# Patient Record
Sex: MALE | Race: WHITE | Employment: FULL TIME | URBAN - METROPOLITAN AREA
[De-identification: names, ages, dates, MRNs, and addresses within clinical notes are randomized per-mention and may not be internally consistent; named-entity substitution may affect disease eponyms.]

---

## 2017-02-01 ENCOUNTER — OFFICE VISIT (OUTPATIENT)
Dept: FAMILY MEDICINE CLINIC | Age: 57
End: 2017-02-01

## 2017-02-01 VITALS
SYSTOLIC BLOOD PRESSURE: 120 MMHG | HEART RATE: 76 BPM | DIASTOLIC BLOOD PRESSURE: 80 MMHG | OXYGEN SATURATION: 99 % | TEMPERATURE: 97.8 F | WEIGHT: 183.8 LBS | BODY MASS INDEX: 24.89 KG/M2 | HEIGHT: 72 IN

## 2017-02-01 DIAGNOSIS — B02.9 HERPES ZOSTER WITHOUT COMPLICATION: Primary | ICD-10-CM

## 2017-02-01 PROCEDURE — 99213 OFFICE O/P EST LOW 20 MIN: CPT | Performed by: NURSE PRACTITIONER

## 2017-02-01 ASSESSMENT — ENCOUNTER SYMPTOMS
RESPIRATORY NEGATIVE: 1
EYES NEGATIVE: 1
ALLERGIC/IMMUNOLOGIC NEGATIVE: 1
GASTROINTESTINAL NEGATIVE: 1

## 2017-04-27 ENCOUNTER — OFFICE VISIT (OUTPATIENT)
Dept: FAMILY MEDICINE CLINIC | Age: 57
End: 2017-04-27

## 2017-04-27 VITALS
BODY MASS INDEX: 24.24 KG/M2 | HEIGHT: 72 IN | OXYGEN SATURATION: 98 % | TEMPERATURE: 97.6 F | WEIGHT: 179 LBS | DIASTOLIC BLOOD PRESSURE: 76 MMHG | SYSTOLIC BLOOD PRESSURE: 132 MMHG | HEART RATE: 75 BPM

## 2017-04-27 DIAGNOSIS — E55.9 VITAMIN D DEFICIENCY: ICD-10-CM

## 2017-04-27 DIAGNOSIS — Z00.00 ROUTINE GENERAL MEDICAL EXAMINATION AT A HEALTH CARE FACILITY: ICD-10-CM

## 2017-04-27 DIAGNOSIS — M16.11 PRIMARY OSTEOARTHRITIS OF RIGHT HIP: ICD-10-CM

## 2017-04-27 DIAGNOSIS — F41.9 ANXIETY: ICD-10-CM

## 2017-04-27 DIAGNOSIS — M15.9 PRIMARY OSTEOARTHRITIS INVOLVING MULTIPLE JOINTS: ICD-10-CM

## 2017-04-27 DIAGNOSIS — G25.81 RESTLESS LEG SYNDROME: ICD-10-CM

## 2017-04-27 DIAGNOSIS — Z00.00 ROUTINE GENERAL MEDICAL EXAMINATION AT A HEALTH CARE FACILITY: Primary | ICD-10-CM

## 2017-04-27 DIAGNOSIS — J30.1 SEASONAL ALLERGIC RHINITIS DUE TO POLLEN: ICD-10-CM

## 2017-04-27 DIAGNOSIS — Z11.4 SCREENING FOR HIV (HUMAN IMMUNODEFICIENCY VIRUS): ICD-10-CM

## 2017-04-27 DIAGNOSIS — K64.4 EXTERNAL HEMORRHOID: ICD-10-CM

## 2017-04-27 DIAGNOSIS — Z12.5 SCREENING PSA (PROSTATE SPECIFIC ANTIGEN): ICD-10-CM

## 2017-04-27 DIAGNOSIS — Z11.59 NEED FOR HEPATITIS C SCREENING TEST: ICD-10-CM

## 2017-04-27 DIAGNOSIS — M47.816 OSTEOARTHRITIS OF LUMBAR SPINE, UNSPECIFIED SPINAL OSTEOARTHRITIS COMPLICATION STATUS: ICD-10-CM

## 2017-04-27 DIAGNOSIS — F32.0 MILD SINGLE CURRENT EPISODE OF MAJOR DEPRESSIVE DISORDER (HCC): ICD-10-CM

## 2017-04-27 LAB
A/G RATIO: 1.8 (ref 1.1–2.2)
ALBUMIN SERPL-MCNC: 4.4 G/DL (ref 3.4–5)
ALP BLD-CCNC: 75 U/L (ref 40–129)
ALT SERPL-CCNC: 13 U/L (ref 10–40)
ANION GAP SERPL CALCULATED.3IONS-SCNC: 10 MMOL/L (ref 3–16)
AST SERPL-CCNC: 17 U/L (ref 15–37)
BILIRUB SERPL-MCNC: 0.6 MG/DL (ref 0–1)
BILIRUBIN, POC: NORMAL
BLOOD URINE, POC: NORMAL
BUN BLDV-MCNC: 20 MG/DL (ref 7–20)
CALCIUM SERPL-MCNC: 8.8 MG/DL (ref 8.3–10.6)
CHLORIDE BLD-SCNC: 100 MMOL/L (ref 99–110)
CHOLESTEROL, TOTAL: 168 MG/DL (ref 0–199)
CLARITY, POC: CLEAR
CO2: 29 MMOL/L (ref 21–32)
COLOR, POC: YELLOW
CREAT SERPL-MCNC: 0.9 MG/DL (ref 0.9–1.3)
GFR AFRICAN AMERICAN: >60
GFR NON-AFRICAN AMERICAN: >60
GLOBULIN: 2.5 G/DL
GLUCOSE BLD-MCNC: 91 MG/DL (ref 70–99)
GLUCOSE URINE, POC: NORMAL
HDLC SERPL-MCNC: 82 MG/DL (ref 40–60)
HEPATITIS C ANTIBODY INTERPRETATION: NORMAL
KETONES, POC: NORMAL
LDL CHOLESTEROL CALCULATED: 77 MG/DL
LEUKOCYTE EST, POC: NORMAL
NITRITE, POC: NORMAL
PH, POC: 6.5
POTASSIUM SERPL-SCNC: 4.4 MMOL/L (ref 3.5–5.1)
PROTEIN, POC: NORMAL
SODIUM BLD-SCNC: 139 MMOL/L (ref 136–145)
SPECIFIC GRAVITY, POC: 1.01
TOTAL PROTEIN: 6.9 G/DL (ref 6.4–8.2)
TRIGL SERPL-MCNC: 45 MG/DL (ref 0–150)
TSH SERPL DL<=0.05 MIU/L-ACNC: 1.19 UIU/ML (ref 0.27–4.2)
UROBILINOGEN, POC: 0.2
VITAMIN D 25-HYDROXY: 44.4 NG/ML
VLDLC SERPL CALC-MCNC: 9 MG/DL

## 2017-04-27 PROCEDURE — 81002 URINALYSIS NONAUTO W/O SCOPE: CPT | Performed by: FAMILY MEDICINE

## 2017-04-27 PROCEDURE — 99396 PREV VISIT EST AGE 40-64: CPT | Performed by: FAMILY MEDICINE

## 2017-04-29 LAB — HIV-1 AND HIV-2 ANTIBODIES: NEGATIVE

## 2017-04-30 ASSESSMENT — ENCOUNTER SYMPTOMS
ALLERGIC/IMMUNOLOGIC NEGATIVE: 1
GASTROINTESTINAL NEGATIVE: 1
RESPIRATORY NEGATIVE: 1
EYES NEGATIVE: 1

## 2017-05-07 DIAGNOSIS — M16.11 PRIMARY OSTEOARTHRITIS OF RIGHT HIP: Primary | ICD-10-CM

## 2017-05-08 RX ORDER — MELOXICAM 15 MG/1
15 TABLET ORAL DAILY PRN
Qty: 90 TABLET | Refills: 1 | Status: SHIPPED | OUTPATIENT
Start: 2017-05-08 | End: 2017-12-04 | Stop reason: SDUPTHER

## 2017-05-18 ENCOUNTER — OFFICE VISIT (OUTPATIENT)
Dept: DERMATOLOGY | Age: 57
End: 2017-05-18

## 2017-05-18 DIAGNOSIS — L57.0 AK (ACTINIC KERATOSIS): ICD-10-CM

## 2017-05-18 DIAGNOSIS — L30.9 ECZEMA OF BOTH HANDS: Primary | ICD-10-CM

## 2017-05-18 PROCEDURE — 99202 OFFICE O/P NEW SF 15 MIN: CPT | Performed by: DERMATOLOGY

## 2017-05-18 PROCEDURE — 17003 DESTRUCT PREMALG LES 2-14: CPT | Performed by: DERMATOLOGY

## 2017-05-18 PROCEDURE — 17000 DESTRUCT PREMALG LESION: CPT | Performed by: DERMATOLOGY

## 2017-06-21 DIAGNOSIS — F32.0 MILD SINGLE CURRENT EPISODE OF MAJOR DEPRESSIVE DISORDER (HCC): ICD-10-CM

## 2017-06-22 RX ORDER — SERTRALINE HYDROCHLORIDE 25 MG/1
25 TABLET, FILM COATED ORAL DAILY
Qty: 90 TABLET | Refills: 1 | Status: SHIPPED | OUTPATIENT
Start: 2017-06-22 | End: 2017-12-26 | Stop reason: SDUPTHER

## 2017-08-27 PROBLEM — E87.20 LACTIC ACIDOSIS: Status: ACTIVE | Noted: 2017-08-27

## 2017-08-27 PROBLEM — R11.2 NAUSEA AND VOMITING: Status: ACTIVE | Noted: 2017-08-27

## 2017-08-27 PROBLEM — R10.9 ABDOMINAL PAIN: Status: ACTIVE | Noted: 2017-08-27

## 2017-08-27 PROBLEM — K52.9 ENTERITIS: Status: ACTIVE | Noted: 2017-08-27

## 2017-08-30 ENCOUNTER — TELEPHONE (OUTPATIENT)
Dept: FAMILY MEDICINE CLINIC | Age: 57
End: 2017-08-30

## 2017-09-05 ENCOUNTER — OFFICE VISIT (OUTPATIENT)
Dept: FAMILY MEDICINE CLINIC | Age: 57
End: 2017-09-05

## 2017-09-05 VITALS
WEIGHT: 176.8 LBS | DIASTOLIC BLOOD PRESSURE: 76 MMHG | SYSTOLIC BLOOD PRESSURE: 130 MMHG | BODY MASS INDEX: 23.95 KG/M2 | HEART RATE: 78 BPM | TEMPERATURE: 97.9 F | HEIGHT: 72 IN | OXYGEN SATURATION: 100 %

## 2017-09-05 DIAGNOSIS — R74.8 ABNORMAL LIVER ENZYMES: ICD-10-CM

## 2017-09-05 DIAGNOSIS — R74.8 ABNORMAL LIVER ENZYMES: Primary | ICD-10-CM

## 2017-09-05 LAB
ALBUMIN SERPL-MCNC: 4.2 G/DL (ref 3.4–5)
ALP BLD-CCNC: 73 U/L (ref 40–129)
ALT SERPL-CCNC: 19 U/L (ref 10–40)
AST SERPL-CCNC: 17 U/L (ref 15–37)
BILIRUB SERPL-MCNC: 0.4 MG/DL (ref 0–1)
BILIRUBIN DIRECT: <0.2 MG/DL (ref 0–0.3)
BILIRUBIN, INDIRECT: NORMAL MG/DL (ref 0–1)
TOTAL PROTEIN: 7 G/DL (ref 6.4–8.2)

## 2017-09-05 PROCEDURE — 99213 OFFICE O/P EST LOW 20 MIN: CPT | Performed by: FAMILY MEDICINE

## 2017-09-05 ASSESSMENT — ENCOUNTER SYMPTOMS
GASTROINTESTINAL NEGATIVE: 1
EYES NEGATIVE: 1
RESPIRATORY NEGATIVE: 1

## 2017-11-10 ENCOUNTER — TELEPHONE (OUTPATIENT)
Dept: OTHER | Facility: CLINIC | Age: 57
End: 2017-11-10

## 2017-11-10 NOTE — TELEPHONE ENCOUNTER
Left message for pt to return call regarding colonoscopy report. Need to get copy of report and attach it to health maintenance.

## 2017-11-13 ENCOUNTER — OFFICE VISIT (OUTPATIENT)
Dept: SURGERY | Age: 57
End: 2017-11-13

## 2017-11-13 VITALS
DIASTOLIC BLOOD PRESSURE: 79 MMHG | SYSTOLIC BLOOD PRESSURE: 132 MMHG | BODY MASS INDEX: 24.11 KG/M2 | HEIGHT: 72 IN | WEIGHT: 178 LBS | TEMPERATURE: 98 F

## 2017-11-13 DIAGNOSIS — K60.2 ANAL FISSURE: Primary | ICD-10-CM

## 2017-11-13 PROCEDURE — 99242 OFF/OP CONSLTJ NEW/EST SF 20: CPT | Performed by: SURGERY

## 2017-12-04 DIAGNOSIS — M16.11 PRIMARY OSTEOARTHRITIS OF RIGHT HIP: ICD-10-CM

## 2017-12-05 RX ORDER — MELOXICAM 15 MG/1
15 TABLET ORAL DAILY
Qty: 90 TABLET | Refills: 1 | Status: SHIPPED | OUTPATIENT
Start: 2017-12-05 | End: 2018-05-31 | Stop reason: SDUPTHER

## 2017-12-26 DIAGNOSIS — F32.0 MILD SINGLE CURRENT EPISODE OF MAJOR DEPRESSIVE DISORDER (HCC): ICD-10-CM

## 2017-12-26 RX ORDER — SERTRALINE HYDROCHLORIDE 25 MG/1
25 TABLET, FILM COATED ORAL DAILY
Qty: 90 TABLET | Refills: 0 | Status: SHIPPED | OUTPATIENT
Start: 2017-12-26 | End: 2018-06-09

## 2018-03-07 ENCOUNTER — OFFICE VISIT (OUTPATIENT)
Dept: FAMILY MEDICINE CLINIC | Age: 58
End: 2018-03-07

## 2018-03-07 VITALS
DIASTOLIC BLOOD PRESSURE: 76 MMHG | HEIGHT: 72 IN | SYSTOLIC BLOOD PRESSURE: 120 MMHG | TEMPERATURE: 97.8 F | BODY MASS INDEX: 24.87 KG/M2 | OXYGEN SATURATION: 99 % | HEART RATE: 70 BPM | WEIGHT: 183.6 LBS

## 2018-03-07 DIAGNOSIS — J30.1 CHRONIC SEASONAL ALLERGIC RHINITIS DUE TO POLLEN: ICD-10-CM

## 2018-03-07 DIAGNOSIS — K64.4 EXTERNAL HEMORRHOID: ICD-10-CM

## 2018-03-07 DIAGNOSIS — Z00.00 ROUTINE GENERAL MEDICAL EXAMINATION AT A HEALTH CARE FACILITY: ICD-10-CM

## 2018-03-07 DIAGNOSIS — M15.9 PRIMARY OSTEOARTHRITIS INVOLVING MULTIPLE JOINTS: ICD-10-CM

## 2018-03-07 DIAGNOSIS — R68.82 DECREASED LIBIDO: ICD-10-CM

## 2018-03-07 DIAGNOSIS — M19.032 PRIMARY OSTEOARTHRITIS OF LEFT WRIST: ICD-10-CM

## 2018-03-07 DIAGNOSIS — G25.81 RESTLESS LEG SYNDROME: ICD-10-CM

## 2018-03-07 DIAGNOSIS — Z00.00 ROUTINE GENERAL MEDICAL EXAMINATION AT A HEALTH CARE FACILITY: Primary | ICD-10-CM

## 2018-03-07 DIAGNOSIS — E55.9 VITAMIN D DEFICIENCY: ICD-10-CM

## 2018-03-07 DIAGNOSIS — F32.0 MILD SINGLE CURRENT EPISODE OF MAJOR DEPRESSIVE DISORDER (HCC): ICD-10-CM

## 2018-03-07 PROBLEM — E87.20 LACTIC ACIDOSIS: Status: RESOLVED | Noted: 2017-08-27 | Resolved: 2018-03-07

## 2018-03-07 PROBLEM — R11.2 NAUSEA AND VOMITING: Status: RESOLVED | Noted: 2017-08-27 | Resolved: 2018-03-07

## 2018-03-07 PROBLEM — K52.9 ENTERITIS: Status: RESOLVED | Noted: 2017-08-27 | Resolved: 2018-03-07

## 2018-03-07 PROBLEM — R10.9 INTRACTABLE ABDOMINAL PAIN: Status: RESOLVED | Noted: 2017-08-27 | Resolved: 2018-03-07

## 2018-03-07 LAB
A/G RATIO: 1.6 (ref 1.1–2.2)
ALBUMIN SERPL-MCNC: 4.4 G/DL (ref 3.4–5)
ALP BLD-CCNC: 72 U/L (ref 40–129)
ALT SERPL-CCNC: 28 U/L (ref 10–40)
ANION GAP SERPL CALCULATED.3IONS-SCNC: 11 MMOL/L (ref 3–16)
AST SERPL-CCNC: 27 U/L (ref 15–37)
BILIRUB SERPL-MCNC: 0.3 MG/DL (ref 0–1)
BILIRUBIN, POC: NORMAL
BLOOD URINE, POC: NORMAL
BUN BLDV-MCNC: 20 MG/DL (ref 7–20)
CALCIUM SERPL-MCNC: 8.7 MG/DL (ref 8.3–10.6)
CHLORIDE BLD-SCNC: 101 MMOL/L (ref 99–110)
CHOLESTEROL, TOTAL: 201 MG/DL (ref 0–199)
CLARITY, POC: CLEAR
CO2: 28 MMOL/L (ref 21–32)
COLOR, POC: YELLOW
CREAT SERPL-MCNC: 0.7 MG/DL (ref 0.9–1.3)
GFR AFRICAN AMERICAN: >60
GFR NON-AFRICAN AMERICAN: >60
GLOBULIN: 2.8 G/DL
GLUCOSE BLD-MCNC: 107 MG/DL (ref 70–99)
GLUCOSE URINE, POC: NORMAL
HDLC SERPL-MCNC: 81 MG/DL (ref 40–60)
KETONES, POC: NORMAL
LDL CHOLESTEROL CALCULATED: 109 MG/DL
LEUKOCYTE EST, POC: NORMAL
NITRITE, POC: NORMAL
PH, POC: 6
POTASSIUM SERPL-SCNC: 4.7 MMOL/L (ref 3.5–5.1)
PROTEIN, POC: NORMAL
SODIUM BLD-SCNC: 140 MMOL/L (ref 136–145)
SPECIFIC GRAVITY, POC: 1.01
TOTAL PROTEIN: 7.2 G/DL (ref 6.4–8.2)
TRIGL SERPL-MCNC: 56 MG/DL (ref 0–150)
TSH SERPL DL<=0.05 MIU/L-ACNC: 1.24 UIU/ML (ref 0.27–4.2)
UROBILINOGEN, POC: 0.2
VITAMIN D 25-HYDROXY: 53.4 NG/ML
VLDLC SERPL CALC-MCNC: 11 MG/DL

## 2018-03-07 PROCEDURE — 99396 PREV VISIT EST AGE 40-64: CPT | Performed by: FAMILY MEDICINE

## 2018-03-07 PROCEDURE — 81002 URINALYSIS NONAUTO W/O SCOPE: CPT | Performed by: FAMILY MEDICINE

## 2018-03-07 ASSESSMENT — ENCOUNTER SYMPTOMS
RESPIRATORY NEGATIVE: 1
EYES NEGATIVE: 1
GASTROINTESTINAL NEGATIVE: 1
ALLERGIC/IMMUNOLOGIC NEGATIVE: 1

## 2018-03-07 NOTE — PROGRESS NOTES
3/7/18    Arcadio Farmer  1960      Chief Complaint   Patient presents with    Annual Exam     Well Adult Physical: Patient here for a comprehensive physical exam.The patient reports no problems  Do you take any herbs or supplements that were not prescribed by a doctor? no Are you taking calcium supplements? no Are you taking aspirin daily? yes   History:        /76   Pulse 70   Temp 97.8 °F (36.6 °C) (Oral)   Ht 6' (1.829 m)   Wt 183 lb 9.6 oz (83.3 kg)   SpO2 99%   BMI 24.90 kg/m²       Immunization History   Administered Date(s) Administered    Tdap (Boostrix, Adacel) 03/24/2015       No Known Allergies  Outpatient Prescriptions Marked as Taking for the 3/7/18 encounter (Office Visit) with Yo Lino MD   Medication Sig Dispense Refill    sertraline (ZOLOFT) 25 MG tablet Take 1 tablet by mouth daily 90 tablet 0    meloxicam (MOBIC) 15 MG tablet Take 1 tablet by mouth daily 90 tablet 1    Cholecalciferol (VITAMIN D) 2000 UNITS CAPS capsule Take by mouth PATIENT ONLY TAKES DURING WINTER MONTHS         Past Medical History:   Diagnosis Date    Allergic rhinitis     Anxiety     Chicken pox     Degenerative joint disease (DJD) of lumbar spine 12/17/2014    Depression     External hemorrhoid      Past Surgical History:   Procedure Laterality Date    APPENDECTOMY      COLONOSCOPY  1.2014    HERNIA REPAIR      bialt    SHOULDER SURGERY Left     cyst and labrum tear repair    VASECTOMY       Family History   Problem Relation Age of Onset    High Blood Pressure Mother     Cancer Mother      colon    Cancer Father      lung, kidney    High Blood Pressure Sister     Heart Disease Brother     Early Death Brother     High Blood Pressure Brother     High Blood Pressure Brother      Social History     Social History    Marital status:      Spouse name: N/A    Number of children: N/A    Years of education: N/A     Occupational History    Not on file.      Social History normal range of motion, no swelling, no effusion and no deformity. No tenderness found. Left elbow: Normal. He exhibits normal range of motion, no swelling, no effusion, no deformity and no laceration. No tenderness found. Right wrist: He exhibits decreased range of motion, tenderness and bony tenderness. He exhibits no swelling, no effusion, no crepitus and no deformity. Left wrist: He exhibits decreased range of motion, tenderness and bony tenderness. He exhibits no swelling, no effusion, no crepitus and no deformity. Right hip: He exhibits decreased range of motion, decreased strength, tenderness and bony tenderness. He exhibits no swelling, no crepitus and no deformity. Left hip: He exhibits decreased range of motion and tenderness. He exhibits normal strength, no bony tenderness, no swelling, no crepitus and no deformity. Right knee: Normal. He exhibits normal range of motion, no swelling, no effusion, no ecchymosis, no deformity, normal patellar mobility and no bony tenderness. Left knee: Normal. He exhibits normal range of motion, no swelling, no effusion, no ecchymosis, no deformity, normal alignment, normal patellar mobility and no bony tenderness. No tenderness found. Right ankle: Normal. He exhibits normal range of motion, no swelling, no ecchymosis, no deformity and normal pulse. Achilles tendon normal. Achilles tendon exhibits no pain and no defect. Left ankle: Normal. He exhibits normal range of motion, no swelling, no ecchymosis, no deformity and normal pulse. No tenderness. Achilles tendon normal. Achilles tendon exhibits no pain and no defect. Cervical back: Normal. He exhibits normal range of motion, no tenderness, no bony tenderness, no pain and no spasm. Thoracic back: Normal. He exhibits normal range of motion, no tenderness, no bony tenderness, no deformity, no pain and no spasm.         Lumbar back: He exhibits single current episode of major depressive disorder ([facility] [address] Condition stable continue the medications, treatments, will check labs as appropriate        3. Vitamin D deficiency Condition stable continue the medications, treatments, will check labs as appropriate     4. Restless leg syndrome Condition stable continue the medications, treatments, will check labs as appropriate     5. Primary osteoarthritis involving multiple joints   The condition is deteriorating, will change treatment, investigate cause and make further recommendations when data back. Need todo exercises     6. External hemorrhoid Condition stable continue the medications, treatments, will check labs as appropriate     7. Decreased libido Condition stable continue the medications, treatments, will check labs as appropriate     8. Chronic seasonal allergic rhinitis due to pollen Condition stable continue the medications, treatments, will check labs as appropriate     9. Primary osteoarthritis of left wrist   The condition is deteriorating, will change treatment, investigate cause and make further recommendations when data back.  Need to do exercise wilda Thomson MD

## 2018-03-08 ENCOUNTER — PATIENT MESSAGE (OUTPATIENT)
Dept: FAMILY MEDICINE CLINIC | Age: 58
End: 2018-03-08

## 2018-03-08 DIAGNOSIS — M16.11 PRIMARY OSTEOARTHRITIS OF RIGHT HIP: Primary | ICD-10-CM

## 2018-03-09 ENCOUNTER — PATIENT MESSAGE (OUTPATIENT)
Dept: FAMILY MEDICINE CLINIC | Age: 58
End: 2018-03-09

## 2018-03-09 RX ORDER — TRAMADOL HYDROCHLORIDE 50 MG/1
50 TABLET ORAL EVERY 6 HOURS PRN
Qty: 28 TABLET | Refills: 0 | Status: SHIPPED | OUTPATIENT
Start: 2018-03-09 | End: 2018-03-16

## 2018-03-09 NOTE — TELEPHONE ENCOUNTER
From: Mary Brandon  To: Deion Little MD  Sent: 3/8/2018 10:20 PM EST  Subject: Visit Follow-Up Question    Hello again Dr. Rika Vera. Sorry to ask this, but my right leg is back to [employer] again. Tylenol and / or Advil do not make any improvement. What do you recommend I do? Maybe see a specialist or come back to see you? Any help is greatly appreciated.  Thanks, Merrill Ganser

## 2018-03-20 ENCOUNTER — OFFICE VISIT (OUTPATIENT)
Dept: ORTHOPEDIC SURGERY | Age: 58
End: 2018-03-20

## 2018-03-20 VITALS
BODY MASS INDEX: 24.38 KG/M2 | SYSTOLIC BLOOD PRESSURE: 128 MMHG | HEIGHT: 72 IN | WEIGHT: 180 LBS | DIASTOLIC BLOOD PRESSURE: 77 MMHG

## 2018-03-20 DIAGNOSIS — M51.36 DDD (DEGENERATIVE DISC DISEASE), LUMBAR: Primary | ICD-10-CM

## 2018-03-20 DIAGNOSIS — M54.16 LUMBAR RADICULITIS: ICD-10-CM

## 2018-03-20 DIAGNOSIS — M54.50 PAIN OF LUMBAR SPINE: ICD-10-CM

## 2018-03-20 PROCEDURE — 99243 OFF/OP CNSLTJ NEW/EST LOW 30: CPT | Performed by: PHYSICAL MEDICINE & REHABILITATION

## 2018-03-20 RX ORDER — METHYLPREDNISOLONE 4 MG/1
TABLET ORAL
Qty: 1 KIT | Refills: 0 | Status: SHIPPED | OUTPATIENT
Start: 2018-03-20 | End: 2018-03-26

## 2018-03-20 RX ORDER — TRAMADOL HYDROCHLORIDE 50 MG/1
50 TABLET ORAL DAILY PRN
COMMUNITY
End: 2019-01-30

## 2018-03-23 ENCOUNTER — HOSPITAL ENCOUNTER (OUTPATIENT)
Dept: PHYSICAL THERAPY | Age: 58
Discharge: HOME | End: 2018-03-31
Admitting: PHYSICAL MEDICINE & REHABILITATION

## 2018-03-30 ENCOUNTER — HOSPITAL ENCOUNTER (OUTPATIENT)
Dept: PHYSICAL THERAPY | Age: 58
Discharge: HOME | End: 2018-03-31
Admitting: PHYSICAL MEDICINE & REHABILITATION

## 2018-04-01 ENCOUNTER — HOSPITAL ENCOUNTER (OUTPATIENT)
Dept: PHYSICAL THERAPY | Age: 58
Discharge: HOME | End: 2018-04-30
Attending: PHYSICAL MEDICINE & REHABILITATION | Admitting: PHYSICAL MEDICINE & REHABILITATION

## 2018-04-06 ENCOUNTER — HOSPITAL ENCOUNTER (OUTPATIENT)
Dept: PHYSICAL THERAPY | Age: 58
Discharge: HOME | End: 2018-04-07
Admitting: PHYSICAL MEDICINE & REHABILITATION

## 2018-04-06 NOTE — FLOWSHEET NOTE
[address] [facility]  [address], [place], [address]          Phone: [phone]   Fax:     [phone]      Physical Therapy Daily Treatment Note  Date:  2018    Patient Name:  Sanju Ugalde    :  1960  MRN: [id number]  Restrictions/Precautions:    Medical/Treatment Diagnosis Information:  · Diagnosis: M51.36 DDD, lumbar; M54.16 lumbar radiculitis; M54.5 pain of lumbar spine  · Treatment Diagnosis: LBP  Insurance/Certification information:  PT Insurance Information: Abie, $50/25 copay, 25 visits, no auth req  Physician Information:  Referring Practitioner: Amrit Andrews MD  Plan of care signed (Y/N):     Date of Patient follow up with Physician:     G-Code (if applicable):      Date G-Code Applied:    PT G-Codes  Functional Assessment Tool Used: Modified Oswestry  Score: 34% disability  Functional Limitation: Changing and maintaining body position  Changing and Maintaining Body Position Current Status (): At least 20 percent but less than 40 percent impaired, limited or restricted  Changing and Maintaining Body Position Goal Status ():  At least 1 percent but less than 20 percent impaired, limited or restricted    Progress Note: []  Yes  []  No  Next due by: Visit #10      Latex Allergy:  [x]NO      []YES  Preferred Language for Healthcare:   [x]English       []other:    Visit # Insurance Allowable   3 25     Pain level:  5-7/10     SUBJECTIVE:      OBJECTIVE:   Observation:   Test measurements:      RESTRICTIONS/PRECAUTIONS: anxiety/depression, R hip OA    Exercises/Interventions:      Exercises/Interventions: Rx 18  intervention reps sets notes       self hamstring stretch tableside 2x30\" B  Manual 330   Manual piriformis stretch      Manual prone quad stretch      Manual sidelying hip flexor stretch      LAD R hip, inferior and lateral mobs, IR stretch, rollerstick over hip and ITB 15\"         Manual supine

## 2018-04-13 ENCOUNTER — HOSPITAL ENCOUNTER (OUTPATIENT)
Dept: PHYSICAL THERAPY | Age: 58
Discharge: HOME | End: 2018-04-14
Admitting: PHYSICAL MEDICINE & REHABILITATION

## 2018-04-13 NOTE — FLOWSHEET NOTE
The [address] [place] [address] and Sports Rehabilitation, [facility]  [address], [place], [address]          Phone: [phone]   Fax:     [phone]      Physical Therapy Daily Treatment Note  Date:  2018    Patient Name:  David Andres    :  1960  MRN: [id number]  Restrictions/Precautions:    Medical/Treatment Diagnosis Information:  · Diagnosis: M51.36 DDD, lumbar; M54.16 lumbar radiculitis; M54.5 pain of lumbar spine  · Treatment Diagnosis: LBP  Insurance/Certification information:  PT Insurance Information: Wood, $50/25 copay, 25 visits, no auth req  Physician Information:  Referring Practitioner: Deborah Parmar MD  Plan of care signed (Y/N):     Date of Patient follow up with Physician:     G-Code (if applicable):      Date G-Code Applied:    PT G-Codes  Functional Assessment Tool Used: Modified Oswestry  Score: 34% disability  Functional Limitation: Changing and maintaining body position  Changing and Maintaining Body Position Current Status (): At least 20 percent but less than 40 percent impaired, limited or restricted  Changing and Maintaining Body Position Goal Status (): At least 1 percent but less than 20 percent impaired, limited or restricted    Progress Note: []  Yes  []  No  Next due by: Visit #10      Latex Allergy:  [x]NO      []YES  Preferred Language for Healthcare:   [x]English       []other:    Visit # Insurance Allowable   4 25     Pain level:  3-4/10     SUBJECTIVE:  Pt reports he had a good start of the week but yesterday was painful. He reports it's too early to tell at this point in the day to tell how his low back and hip are.     OBJECTIVE:   Observation:   Test measurements:      RESTRICTIONS/PRECAUTIONS: anxiety/depression, R hip OA    Exercises/Interventions:      Exercises/Interventions: Rx 18  intervention reps sets notes       self hamstring stretch tableside 2x30\" B  Manual 3/30   Manual piriformis stretch      Manual

## 2018-04-24 ENCOUNTER — OFFICE VISIT (OUTPATIENT)
Dept: ORTHOPEDIC SURGERY | Age: 58
End: 2018-04-24

## 2018-04-24 VITALS
WEIGHT: 179.9 LBS | HEART RATE: 95 BPM | BODY MASS INDEX: 24.37 KG/M2 | HEIGHT: 72 IN | SYSTOLIC BLOOD PRESSURE: 144 MMHG | DIASTOLIC BLOOD PRESSURE: 86 MMHG

## 2018-04-24 DIAGNOSIS — M25.551 RIGHT HIP PAIN: ICD-10-CM

## 2018-04-24 DIAGNOSIS — M51.36 DDD (DEGENERATIVE DISC DISEASE), LUMBAR: Primary | ICD-10-CM

## 2018-04-24 PROCEDURE — 99213 OFFICE O/P EST LOW 20 MIN: CPT | Performed by: PHYSICAL MEDICINE & REHABILITATION

## 2018-04-30 ENCOUNTER — OFFICE VISIT (OUTPATIENT)
Dept: ORTHOPEDIC SURGERY | Age: 58
End: 2018-04-30

## 2018-04-30 VITALS
BODY MASS INDEX: 23.7 KG/M2 | SYSTOLIC BLOOD PRESSURE: 140 MMHG | HEART RATE: 62 BPM | DIASTOLIC BLOOD PRESSURE: 84 MMHG | WEIGHT: 175 LBS | HEIGHT: 72 IN

## 2018-04-30 DIAGNOSIS — M16.31 OSTEOARTHRITIS RESULTING FROM RIGHT HIP DYSPLASIA: Primary | ICD-10-CM

## 2018-04-30 PROCEDURE — 20611 DRAIN/INJ JOINT/BURSA W/US: CPT | Performed by: ORTHOPAEDIC SURGERY

## 2018-04-30 PROCEDURE — 99243 OFF/OP CNSLTJ NEW/EST LOW 30: CPT | Performed by: ORTHOPAEDIC SURGERY

## 2018-05-01 ENCOUNTER — HOSPITAL ENCOUNTER (OUTPATIENT)
Dept: PHYSICAL THERAPY | Age: 58
Discharge: HOME | End: 2018-05-31
Attending: PHYSICAL MEDICINE & REHABILITATION | Admitting: PHYSICAL MEDICINE & REHABILITATION

## 2018-05-10 ENCOUNTER — PATIENT MESSAGE (OUTPATIENT)
Dept: FAMILY MEDICINE CLINIC | Age: 58
End: 2018-05-10

## 2018-05-10 DIAGNOSIS — M15.9 PRIMARY OSTEOARTHRITIS INVOLVING MULTIPLE JOINTS: Primary | ICD-10-CM

## 2018-05-11 PROBLEM — M12.9 ARTHRITIS, MULTIPLE JOINT INVOLVEMENT: Status: RESOLVED | Noted: 2018-05-11 | Resolved: 2018-05-11

## 2018-05-11 PROBLEM — M12.9 ARTHRITIS, MULTIPLE JOINT INVOLVEMENT: Status: ACTIVE | Noted: 2018-05-11

## 2018-05-15 ENCOUNTER — OFFICE VISIT (OUTPATIENT)
Dept: DERMATOLOGY | Age: 58
End: 2018-05-15

## 2018-05-15 DIAGNOSIS — L57.0 AK (ACTINIC KERATOSIS): Primary | ICD-10-CM

## 2018-05-15 DIAGNOSIS — D18.01 CHERRY ANGIOMA: ICD-10-CM

## 2018-05-15 PROCEDURE — 99213 OFFICE O/P EST LOW 20 MIN: CPT | Performed by: DERMATOLOGY

## 2018-05-31 DIAGNOSIS — M16.11 PRIMARY OSTEOARTHRITIS OF RIGHT HIP: ICD-10-CM

## 2018-06-01 RX ORDER — MELOXICAM 15 MG/1
15 TABLET ORAL DAILY PRN
Qty: 90 TABLET | Refills: 1 | Status: SHIPPED | OUTPATIENT
Start: 2018-06-01 | End: 2019-01-22 | Stop reason: SDUPTHER

## 2018-06-09 DIAGNOSIS — F32.0 MILD SINGLE CURRENT EPISODE OF MAJOR DEPRESSIVE DISORDER (HCC): ICD-10-CM

## 2018-06-09 RX ORDER — SERTRALINE HYDROCHLORIDE 25 MG/1
25 TABLET, FILM COATED ORAL DAILY
Qty: 90 TABLET | Refills: 1 | Status: SHIPPED | OUTPATIENT
Start: 2018-06-09 | End: 2019-01-22 | Stop reason: SDUPTHER

## 2018-08-10 ENCOUNTER — OFFICE VISIT (OUTPATIENT)
Dept: ORTHOPEDIC SURGERY | Age: 58
End: 2018-08-10

## 2018-08-10 VITALS
HEART RATE: 85 BPM | HEIGHT: 72 IN | BODY MASS INDEX: 23.7 KG/M2 | WEIGHT: 175 LBS | DIASTOLIC BLOOD PRESSURE: 76 MMHG | SYSTOLIC BLOOD PRESSURE: 144 MMHG

## 2018-08-10 DIAGNOSIS — M25.552 LEFT HIP PAIN: Primary | ICD-10-CM

## 2018-08-10 PROCEDURE — 20611 DRAIN/INJ JOINT/BURSA W/US: CPT | Performed by: ORTHOPAEDIC SURGERY

## 2018-08-10 PROCEDURE — 99213 OFFICE O/P EST LOW 20 MIN: CPT | Performed by: ORTHOPAEDIC SURGERY

## 2018-08-10 NOTE — PROGRESS NOTES
Posterior Impingement    [] Shuck test for insufficient suction seal   [] Dial test for capsular insufficiency:    [] Resisted adduction for athletic pubalgia   [] Resisted curl up for athletic pubalgia     Motor Function:  [x] No gross motor weakness of hip [x] No gross motor weakness of knee  [x] No gross motor weakness of ankle    [x] No gross motor weakness of great toe      Neurologic:   [x] Sensation to light touch intact  [x] Coordination / proprioception intact  Motor function intact L2-S1    Circulation:  [x] The limb is warm and well perfused. [x] Capillary refill is intact. [] Edema:  [x] none  [] mild  [] moderate  [] severe       Data Reviewed:     XRays:  (4 views: Standing AP, 45 degree Andrew, Frog leg lateral and False Profile) of his LEFT hips and pelvis taken today 8/10/18 in the office and reviewed by me personally showed: Loss of joint space. Findings consistent with osteoarthritis include asymmetric joint space narrowing, subchondral sclerosis, osteophytes. Other Imaging:  NONE    Assessment:     Jb Stewart is a 59y.o. year old male who presents with Left and right sided primary hip arthritis. Thus far Jb Stewart has tried a intra-articular hip joint injection in the right side for hip OA and had a very extremely good response. In regards to his left hip he has not tried anything for this including injections or MRI. He has taken over-the-counter anti-inflammatory agents which have not been helpful for him. Diagnosis:    Diagnosis Orders   1. Left hip pain  XR HIP LEFT (2-3 VIEWS)    Ultrasound guided needle placement    KS ARTHROCENTESIS ASPIR&/INJ MAJOR JT/BURSA W/O US    KS METHYLPREDNISOLONE 40 MG INJ         Plan:     I discussed the diagnosis and the treatment options with Jb Stewart today as well as the nature of the condition. We are recommending that he undergo a left intra-articular hip joint injection which he was agreeable to.   He was asked to continue to use oral

## 2018-08-24 ENCOUNTER — OFFICE VISIT (OUTPATIENT)
Dept: ORTHOPEDIC SURGERY | Age: 58
End: 2018-08-24

## 2018-08-24 VITALS
HEART RATE: 95 BPM | WEIGHT: 175 LBS | HEIGHT: 72 IN | SYSTOLIC BLOOD PRESSURE: 122 MMHG | BODY MASS INDEX: 23.7 KG/M2 | DIASTOLIC BLOOD PRESSURE: 70 MMHG

## 2018-08-24 DIAGNOSIS — M16.0 BILATERAL PRIMARY OSTEOARTHRITIS OF HIP: ICD-10-CM

## 2018-08-24 DIAGNOSIS — M54.5 LOW BACK PAIN, UNSPECIFIED BACK PAIN LATERALITY, UNSPECIFIED CHRONICITY, WITH SCIATICA PRESENCE UNSPECIFIED: Primary | ICD-10-CM

## 2018-08-24 PROCEDURE — 99214 OFFICE O/P EST MOD 30 MIN: CPT | Performed by: ORTHOPAEDIC SURGERY

## 2018-08-24 NOTE — PROGRESS NOTES
rotation: 10 positive Mild discomfort  Supine External rotation: 45    Palpation:   Nontender    Provocative Tests:  [x] Negative: log roll  Positive Tests:  [] Log Roll   [] Kamini Test: ITB Tightness    [] FADIR Anterior impingement: Positive   [] Posterior Impingement    [] Shuck test for insufficient suction seal   [] Dial test for capsular insufficiency:    [] Resisted adduction for athletic pubalgia   [] Resisted curl up for athletic pubalgia     Motor Function:  [x] No gross motor weakness of hip [x] No gross motor weakness of knee  [x] No gross motor weakness of ankle    [x] No gross motor weakness of great toe    Neurologic:   [x] Sensation to light touch intact  [x] Coordination / proprioception intact  Motor function intact L2-S1    Circulation:  [x] The limb is warm and well perfused. [x] Capillary refill is intact.   [x] Edema:  [x] none  [] mild  [] moderate  [] severe     LEFT HIP ORTHOPAEDIC  EXAM:  Inspection:  [x] Skin intact without abrasion, lacerations or rashes  [x] Leg lengths equal  [x] Ecchymosis:  [x] none  [] mild  [] moderate  [] severe   [x] Atrophy:  [x] none  [] mild  [] moderate  [] severe      Range of Motion:  [x] No flexion contracture         [] Deferred: acute injury/post-surgery/pain  [] Flexion contracture     Forward flexion: 90  Supine Internal rotation: 10 mild discomfort  Supine External rotation: 45    Palpation:   Nontender    Provocative Tests:  [x] Negative: Logroll  Positive Tests:  [] Log Roll   [] Kamini Test: ITB Tightness    [] FADIR Anterior impingement: Positive   [] Posterior Impingement    [] Shuck test for insufficient suction seal   [] Dial test for capsular insufficiency:    [] Resisted adduction for athletic pubalgia   [] Resisted curl up for athletic pubalgia     Motor Function:  [x] No gross motor weakness of hip [x] No gross motor weakness of knee  [x] No gross motor weakness of ankle    [x] No gross motor weakness of great toe    Neurologic:   [x] Sensation to light touch intact  [x] Coordination / proprioception intact  Motor function intact L2-S1    Circulation:  [x] The limb is warm and well perfused. [x] Capillary refill is intact. [x] Edema:  [x] none  [] mild  [] moderate  [] severe     Data Reviewed:     XRays:  No new imaging studies were obtained today. Assessment:     Venessa Salas is a 59y.o. year old male who presents with left and right sided primary hip arthritis. Thus far the intra-articular injections have been helpful to decrease his pain. He presents today with low back pain without history of injury. Diagnosis:    Diagnosis Orders   1. Low back pain, unspecified back pain laterality, unspecified chronicity, with sciatica presence unspecified  [facility]    Amrita Muniz MD (spine)   2. Bilateral primary osteoarthritis of hip  [facility]         Plan:     I discussed the diagnosis and the treatment options with Venessa Salas today as well as the nature of the condition. As far as his hips we will continue to monitor him and may consider repeat injections in the future as needed. I will send him for physical therapy for both hips and his lower back. If he symptoms in his low back worsen or fail to improve over the next few weeks I would like him to follow up with a spine specialist. I did refer him to Dr. Argelia Simmons. Questions were entertained and answered today. He is in agreement with this plan. Return to Clinic/Follow - Up:  6-8 weeks PRN    Venessa Salas was instructed to call the office if his symptoms worsen or if new symptoms appear prior to the next scheduled visit. He is specifically instructed to contact the office between now & his scheduled appointment if he has concerns related to his condition or if he needs assistance in scheduling the above tests. He is welcome to call for an appointment sooner if he has any additional concerns or questions. Orders Placed This Encounter   Procedures    [facility]     Referral Priority:   Routine     Referral Type:   Eval and Treat     Referral Reason:   Specialty Services Required     Requested Specialty:   Physical Therapy     Number of Visits Requested:   1    Sandra Mathur MD (spine)     Referral Priority:   Routine     Referral Type:   Consult for Advice and Opinion     Referral Reason:   Specialty Services Required     Referred to Provider:   Austin Page MD     Requested Specialty:   Pain Management     Number of Visits Requested:   1       Refills/New Prescriptions:  No orders of the defined types were placed in this encounter. Today's prescription medications will be e-scribed (when appropriate) to the Patient's Preferred Pharmacy:   [facility], [address]  [address] [place] [address] Av  Phone: [phone] Fax: [phone]     9:36 AM      Madelin Dubon PA-C  Physician Assistant, Orthopedic Surgery and Sports Medicine    During this examination, Madelin Dubon PA-C, functioned as a scribe for Dr. Kristy Carvalho. This dictation was performed with a verbal recognition program (DRAGON) and it was checked for errors. It is possible that there are still dictated errors within this office note. If so, please bring any errors to my attention for an addendum. All efforts were made to ensure that this office note is accurate. Attending Attestation Note:    I, Dr. Kristy Carvalho MD, personally performed the services described in this documentation as scribed by Madelin Dubon PA-C   in my presence, and it is both accurate and complete.       Kristy Carvalho MD  Orthopaedic Surgeon, Sports Medicine  Director, Hip Arthroscopy and [address] [place] [address]

## 2018-08-28 ENCOUNTER — OFFICE VISIT (OUTPATIENT)
Dept: ORTHOPEDIC SURGERY | Age: 58
End: 2018-08-28

## 2018-08-28 ENCOUNTER — HOSPITAL ENCOUNTER (OUTPATIENT)
Dept: PHYSICAL THERAPY | Age: 58
Setting detail: THERAPIES SERIES
Discharge: HOME | End: 2018-08-28
Payer: COMMERCIAL

## 2018-08-28 VITALS — HEIGHT: 74 IN | WEIGHT: 175.04 LBS | BODY MASS INDEX: 22.46 KG/M2

## 2018-08-28 DIAGNOSIS — S39.012A STRAIN OF LUMBAR REGION, INITIAL ENCOUNTER: Primary | ICD-10-CM

## 2018-08-28 DIAGNOSIS — Z91.81 STATUS POST FALL: ICD-10-CM

## 2018-08-28 DIAGNOSIS — M47.816 SPONDYLOSIS OF LUMBAR REGION WITHOUT MYELOPATHY OR RADICULOPATHY: ICD-10-CM

## 2018-08-28 PROCEDURE — G8978 MOBILITY CURRENT STATUS: HCPCS | Performed by: PHYSICAL THERAPIST

## 2018-08-28 PROCEDURE — 99214 OFFICE O/P EST MOD 30 MIN: CPT | Performed by: PHYSICAL MEDICINE & REHABILITATION

## 2018-08-28 PROCEDURE — 97112 NEUROMUSCULAR REEDUCATION: CPT | Performed by: PHYSICAL THERAPIST

## 2018-08-28 PROCEDURE — 97161 PT EVAL LOW COMPLEX 20 MIN: CPT | Performed by: PHYSICAL THERAPIST

## 2018-08-28 PROCEDURE — 97110 THERAPEUTIC EXERCISES: CPT | Performed by: PHYSICAL THERAPIST

## 2018-08-28 PROCEDURE — 97140 MANUAL THERAPY 1/> REGIONS: CPT | Performed by: PHYSICAL THERAPIST

## 2018-08-28 PROCEDURE — G8979 MOBILITY GOAL STATUS: HCPCS | Performed by: PHYSICAL THERAPIST

## 2018-08-28 RX ORDER — CYCLOBENZAPRINE HCL 10 MG
10 TABLET ORAL NIGHTLY
Qty: 30 TABLET | Refills: 0 | Status: SHIPPED | OUTPATIENT
Start: 2018-08-28 | End: 2018-09-27

## 2018-08-28 RX ORDER — METHYLPREDNISOLONE 4 MG/1
TABLET ORAL
Qty: 1 KIT | Refills: 0 | Status: SHIPPED | OUTPATIENT
Start: 2018-08-28 | End: 2018-09-03

## 2018-08-28 NOTE — FLOWSHEET NOTE
[address] [facility]    Physical Therapy Daily Treatment Note  Date:  2018    Patient Name:  Mirtha Ramos    :  1960  MRN: [id number]  Restrictions/Precautions:    Medical/Treatment Diagnosis Information:  · Diagnosis: M54.5 (ICD-10-CM) - Low back pain, unspecified back pain laterality, unspecified chronicity, with sciatica presence unspecified; M16.0 (ICD-10-CM) - Bilateral primary osteoarthritis of hip  · Treatment Diagnosis: pelvic asymmetry, hip tightness, glute weakness, antalgic gait, impaired ADLs and IADLs  Insurance/Certification information:  PT Insurance Information: PT BENEFITS 2018 FACILITY/ BCBS/ EFFECTIVE 10-1-17/ ACTIVE/ DED 3500 MET 1655.59/ PAYS 100%/ 25 VPCY/ 0 JENNIFER/ ETHEL/ REF# [id number]/ 18 PAG  Physician Information:  Referring Practitioner: Rosana Ricardo MD  Plan of care signed (Y/N):     Date of Patient follow up with Physician: Not scheduled at this time    G-Code (if applicable):      Date G-Code Applied:  18  PT G-Codes  Functional Assessment Tool Used: MONI  Score: =46% deficit  Functional Limitation: Mobility: Walking and moving around  Mobility: Walking and Moving Around Current Status ():  At least 40 percent but less than 60 percent impaired, limited or restricted  Mobility: Walking and Moving Around Goal Status (): 0 percent impaired, limited or restricted    Progress Note: [x]  Yes  []  No  Next due by: Visit #10       Latex Allergy:  [x]NO      []YES  Preferred Language for Healthcare:   [x]English       []other:    Visit # Insurance Allowable   1 25  (4 used previously)     Pain level: 5/10     SUBJECTIVE:  See eval    OBJECTIVE:        Standing Exam Normal Abnormal N/A Comments   Toe walk   x         Heel Walk x         Side bending   x   + for tightness, more limited toward R and noted discomfort at L PSIS   Pelvic Height x         Fwd Bend- (aberrant juttering or innominate mvmt) x         Extension   x   + for tightness  Discomfort at L PSIS   Trendelenburg   x       Kemps/Quadrant x         Stork   x   hypomobile   SLS/SLS w rotation   x   pain   Rosie sign   x                   Seated Exam Normal Abnormal N/A Comments   Pelvic Height   x   R IC elevated 1 inch  L PSIS elevated superior and posterior   Seated Rotation   x   Pain on L with L rotation   Seated flexion x         B hip IR   X                               Supine Exam Normal Abnormal N/A Comments   Hip flexion   x       Abduction x         ER x         IR   x   + tightness and pain B   RAYMUNDO/Yaakov   x   + for tightness B   Hip scour x L x R       SLR x         Crossed SLR x         Supine to sit x         SI distraction/compression x         Hip thrust x L x R                               Prone Exam Normal Abnormal N/A Comments   Prone knee bend x         Prone hip IR           B Achilles reflex/Pheasant           PA/Spring x         Prone Instability test x         Sacral Spring/thrust x                                    ROM LEFT RIGHT Comments   Lumbar Flex         Lumbar Ext         Side Bend         Rotation                                ROM LEFT RIGHT Comments   Hip Flexion 90 AROM  95 PROM 90 AROM  95 PROM Limited by pain   Hip Abd 30 30     Hip ER 40 30     Hip IR 10 0     Hip Extension 5 10     Knee Ext 0 0     Knee Flex 130 130        Strength LEFT RIGHT Comments   Ilipsoas 3+/5 4-/5     Glute max 4-/5 4-/5     Glute med 3+/5 4-/5     Glute min 3+/5 4-/5     Quad 4-/5 4/5     Hamstring 4/5 4/5     Ankle DF 5/5 5/5                                      Myotomes Normal Abnormal Comments   Hip flexion (L1-L2)   x weakness   Knee extension (L2-L4)   x weakness   Dorsiflexion (L4-L5) x       Great Toe Ext (L5) x       Ankle Eversion (S1-S2) x       Ankle PF(S1-S2) x             Dermatomes Normal Abnormal Comments   inguinal area (L1)  x       anterior mid-thigh (L2) x       distal ant thigh/med knee (L3) x

## 2018-08-28 NOTE — PLAN OF CARE
that with his line of work he expected aches/pains and so would often work through them without thinking much of it. Pt gets pain through the groin that has been much better since cortisone injections, more improvement on R than L. R injection was in April while L injection was this month. About a month ago he misjudged the edge of a pool and fell in, R leg went into pool while the L leg stayed on the ground, pt states that since the pool incident his pain and symptoms have been more intense and have been constant, but no new pains have developed. Pt states that he does get pain that radiates into the anterior thigh and shin, this is not present all the time, often becomes present as the day progresses,    -locking/catching in back and hips    Pt had a few sessions of PT for the his back earlier this year, which he feels did help a little bit but he was also having trouble with his hips at that time. Pt can not play with his grandson as much as he would like d/t pain. Continues to complete all work tasks but with pain. Goals: get relief from pain  Hobbies: playing with grandson    Relevant Medical History: see intake forms  Functional Disability Index/G-Codes:  PT G-Codes  Functional Assessment Tool Used: MONI  Score: 23/50=46% deficit  Functional Limitation: Mobility: Walking and moving around  Mobility: Walking and Moving Around Current Status ():  At least 40 percent but less than 60 percent impaired, limited or restricted  Mobility: Walking and Moving Around Goal Status (): 0 percent impaired, limited or restricted    Pain Scale: 6-7/10 on average, 9/10 at worst; soreness/achiness/stiffness  Easing factors: laying in recliner  Provocative factors: squatting, laying down at night, stairs, prolonged positioning, playing with grandson    Type: [x]Constant   []Intermittent  []Radiating []Localized []other:     Numbness/Tingling: No    Occupation/School:     Living Status/Prior Level of Function: Independent with ADLs and IADLs.     OBJECTIVE:     Standing Exam Normal Abnormal N/A Comments   Toe walk   x      Heel Walk x      Side bending  x  + for tightness, more limited toward R and noted discomfort at L PSIS   Pelvic Height x      Fwd Bend- (aberrant juttering or innominate mvmt) x      Extension  x  + for tightness  Discomfort at L PSIS   Trendelenburg  x     Kemps/Quadrant x      Stork  x  hypomobile   SLS/SLS w rotation  x  pain   Rosie sign  x            Seated Exam Normal Abnormal N/A Comments   Pelvic Height  x  R IC elevated 1 inch  L PSIS elevated superior and posterior   Seated Rotation  x  Pain on L with L rotation   Seated flexion x      B hip IR  X                   Supine Exam Normal Abnormal N/A Comments   Hip flexion  x     Abduction x      ER x      IR  x  + tightness and pain B   RAYMUNDO/Yaakov  x  + for tightness B   Hip scour x L x R     SLR x      Crossed SLR x      Supine to sit x      SI distraction/compression x      Hip thrust x L x R                   Prone Exam Normal Abnormal N/A Comments   Prone knee bend x      Prone hip IR       B Achilles reflex/Pheasant       PA/Spring x      Prone Instability test x      Sacral Spring/thrust x                      ROM LEFT RIGHT Comments   Lumbar Flex      Lumbar Ext      Side Bend      Rotation                    ROM LEFT RIGHT Comments   Hip Flexion 90 AROM  95 PROM 90 AROM  95 PROM Limited by pain   Hip Abd 30 30    Hip ER 40 30    Hip IR 10 0    Hip Extension 5 10    Knee Ext 0 0    Knee Flex 130 130      Strength LEFT RIGHT Comments   Ilipsoas 3+/5 4-/5    Glute max 4-/5 4-/5    Glute med 3+/5 4-/5    Glute min 3+/5 4-/5    Quad 4-/5 4/5    Hamstring 4/5 4/5    Ankle DF 5/5 5/5                         Myotomes Normal Abnormal Comments   Hip flexion (L1-L2)  x weakness   Knee extension (L2-L4)  x weakness   Dorsiflexion (L4-L5) x     Great Toe Ext (L5) x     Ankle Eversion (S1-S2) x     Ankle PF(S1-S2) x         Dermatomes Normal Abnormal Comments   inguinal area (L1)  x     anterior mid-thigh (L2) x     distal ant thigh/med knee (L3) x     medial lower leg and foot (L4) x     lateral lower leg and foot (L5) x     posterior calf (S1) x     medial calcaneus (S2) x         Neural dynamic tension testing Normal Abnormal Comments   Slump Test  - Degree of knee flexion:  x     SLR       0-30 x     30-70 x     Femoral nerve (L2-4) x       Reflexes Normal Abnormal Comments   S1-2 Seated achilles   Unable to elicit reflex    X3-2 Prone knee bend   Unable to elicit reflex   Y0-3 Patellar tendon   Unable to elicit reflex   D9-2 Biceps   Not assessed   C6 Brachioradialis   Not assessed   C7-8 Triceps   Not assessed   Clonus   Not assessed   Babinski   Not assessed   Garcia's   Not assessed     Joint mobility:    []Normal    [x]Hypo; B hips, lumbar spine   []Hyper    Palpation: L PSIS, B hip flexor tendon    Functional Mobility/Transfers: Independent    Posture: WNL    Gait: Increased lateral sway                       [x] Patient history, allergies, meds reviewed. Medical chart reviewed. See intake form. Review Of Systems (ROS):  [x]Performed Review of systems (Integumentary, CardioPulmonary, Neurological) by intake and observation. Intake form has been scanned into medical record. Patient has been instructed to contact their primary care physician regarding ROS issues if not already being addressed at this time.       Co-morbidities/Complexities (which will affect course of rehabilitation):   []None           Arthritic conditions   []Rheumatoid arthritis (M05.9)  [x]Osteoarthritis (M19.91)   Cardiovascular conditions   []Hypertension (I10)  []Hyperlipidemia (E78.5)  []Angina pectoris (I20)  []Atherosclerosis (I70)   Musculoskeletal conditions   []Disc pathology   []Congenital spine pathologies   []Prior surgical intervention  []Osteoporosis (M81.8)  []Osteopenia (M85.8)   Endocrine conditions   []Hypothyroid (E03.9)  []Hyperthyroid

## 2018-08-28 NOTE — PROGRESS NOTES
Follow up: [facility]  1960  [id number]      CHIEF COMPLAINT:    Chief Complaint   Patient presents with    Lower Back Pain     F/u LSP. Patient last seen April 2018. States injections from Dr. Sunny Dozier for hip pain have helped. James Larson off pool edge approximately 4 weeks ago and low back pain worsened. Notes he started PT today for both low back and hip pain. HISTORY OF PRESENT ILLNESS:  Mr. Joe Suresh is a 59 y.o. male returns for a follow up visit for multiple medical problems. His current presenting problems are   1. Strain of lumbar region, initial encounter    2. Status post fall    3. Spondylosis of lumbar region without myelopathy or radiculopathy    . As per information/history obtained from the PADT(patient assessment and documentation tool) - He complains of pain in the lower back with radiation to the hips Bilateral He rates the pain 7/10 and describes it as aching. Pain is made worse by: standing, lifting. He denies side effects from the current pain regimen. Patient reports that since the last follow up visit the physical functioning is worse, family/social relationships are unchanged, mood is unchanged and sleep patterns are worse, and that the overall functioning is worse. Patient denies neurological bowel or bladder. He presents after last being seen in April 2018. He reports that he was playing with a ball and tripped on the corner of the pool and did the splits. He denies having any trauma to the low back. Since then he has had worsening back and hip pain. He saw Dr. Sunny Dozier and was sent for physical therapy. He has also in the interim had bilateral hip injections which gave him significant relief of his pain. He has not yet started physical therapy as he states he is having difficulty sleeping.     Associated signs and symptoms:   Neurogenic bowel or bladder symptoms:  no   Perceived weakness:  no   Difficulty walking:  no              Past Medical History: Past Medical History:   Diagnosis Date    Allergic rhinitis     Anxiety     Chicken pox     Degenerative joint disease (DJD) of lumbar spine 12/17/2014    Depression     External hemorrhoid       Past Surgical History:     Past Surgical History:   Procedure Laterality Date    APPENDECTOMY      COLONOSCOPY  1.2014    HERNIA REPAIR      bialt    SHOULDER SURGERY Left     cyst and labrum tear repair    VASECTOMY       Current Medications:     Current Outpatient Prescriptions:     sertraline (ZOLOFT) 25 MG tablet, Take 1 tablet by mouth daily, Disp: 90 tablet, Rfl: 1    meloxicam (MOBIC) 15 MG tablet, Take 1 tablet by mouth daily as needed for Pain, Disp: 90 tablet, Rfl: 1    traMADol (ULTRAM) 50 MG tablet, Take 50 mg by mouth daily as needed for Pain., Disp: , Rfl:     Cholecalciferol (VITAMIN D) 2000 UNITS CAPS capsule, Take by mouth PATIENT ONLY TAKES DURING WINTER MONTHS, Disp: , Rfl:   Allergies:  Patient has no known allergies. Social History:    reports that he has never smoked. He has never used smokeless tobacco. He reports that he drinks alcohol. Family History:   Family History   Problem Relation Age of Onset    High Blood Pressure Mother    [facility] Cancer Mother         colon    Cancer Father         lung, kidney    High Blood Pressure Sister     Heart Disease Brother     Early Death Brother     High Blood Pressure Brother     High Blood Pressure Brother        REVIEW OF SYSTEMS:   CONSTITUTIONAL: Denies unexplained weight loss, fevers, chills or fatigue  NEUROLOGICAL: Denies unsteady gait or progressive weakness  MUSCULOSKELETAL: Denies joint swelling or redness  GI: Denies nausea, vomiting, diarrhea   : Denies bowel or bladder issues       PHYSICAL EXAM:    Vitals: Height 6' 2.41\" (1.89 m), weight 175 lb 0.7 oz (79.4 kg). GENERAL EXAM:  · General Apparence: Patient is adequately groomed with no evidence of malnutrition.   · Psychiatric: Orientation: The patient is oriented to time, Inspection/examination of the right lower extremity does not show any tenderness, deformity or injury. Range of motion is normal and pain-free. There is no gross instability. There are no rashes, ulcerations or lesions. Strength and tone are normal. No atrophy or abnormal movements are noted. · LEFT LOWER EXTREMITY:  Inspection/examination of the left lower extremity does not show any tenderness, deformity or injury. Range of motion is normal and pain-free. There is no gross instability. There are no rashes, ulcerations or lesions. Strength and tone are normal. No atrophy or abnormal movements are noted. Diagnostic Testing:    No new diagnostics  Results for orders placed or performed in visit on 03/07/18   Lipid Panel   Result Value Ref Range    Cholesterol, Total 201 (H) 0 - 199 mg/dL    Triglycerides 56 0 - 150 mg/dL    HDL 81 (H) 40 - 60 mg/dL    LDL Calculated 109 (H) <100 mg/dL    VLDL Cholesterol Calculated 11 Not Established mg/dL   Comprehensive Metabolic Panel   Result Value Ref Range    Sodium 140 136 - 145 mmol/L    Potassium 4.7 3.5 - 5.1 mmol/L    Chloride 101 99 - 110 mmol/L    CO2 28 21 - 32 mmol/L    Anion Gap 11 3 - 16    Glucose 107 (H) 70 - 99 mg/dL    BUN 20 7 - 20 mg/dL    CREATININE 0.7 (L) 0.9 - 1.3 mg/dL    GFR Non-African American >60 >60    GFR African American >60 >60    Calcium 8.7 8.3 - 10.6 mg/dL    Total Protein 7.2 6.4 - 8.2 g/dL    Alb 4.4 3.4 - 5.0 g/dL    Albumin/Globulin Ratio 1.6 1.1 - 2.2    Total Bilirubin 0.3 0.0 - 1.0 mg/dL    Alkaline Phosphatase 72 40 - 129 U/L    ALT 28 10 - 40 U/L    AST 27 15 - 37 U/L    Globulin 2.8 g/dL   TSH without Reflex   Result Value Ref Range    TSH 1.24 0.27 - 4.20 uIU/mL   Vitamin D 25 Hydroxy   Result Value Ref Range    Vit D, 25-Hydroxy 53.4 >=30 ng/mL     Impression:       1. Strain of lumbar region, initial encounter    2. Status post fall    3.  Spondylosis of lumbar region without myelopathy or radiculopathy        Plan:  Clinical Course:new lumbar strain    1. Medications:  I will prescribe a medrol dose pack to help with the inflammatory component of pain. Risks, benefits and alternatives were discussed. Recommended to stop any NSAIDs to reduce risk of GI bleed during the course of the dose pack. Add flexeril 10 mg po qhs #30  2. PT:  Agree with PT  3. Further studies:  MR lumbar spine if symptoms worsen  4. Interventional:  None at this time  5. Follow up:  4-6 weeks          Crow Martinez MD, MITA, [facility]  Board Certified in [address] [place] [address] Certified and Fellowship Trained in [facility] ([facility])             This dictation was performed with a verbal recognition program [facility]) and it was checked for errors. It is possible that there are still dictated errors within this office note. If so, please bring any errors to my attention for an addendum. All efforts were made to ensure that this office note is accurate.

## 2018-09-25 ENCOUNTER — OFFICE VISIT (OUTPATIENT)
Dept: ORTHOPEDIC SURGERY | Age: 58
End: 2018-09-25
Payer: COMMERCIAL

## 2018-09-25 VITALS
SYSTOLIC BLOOD PRESSURE: 128 MMHG | HEIGHT: 74 IN | DIASTOLIC BLOOD PRESSURE: 77 MMHG | WEIGHT: 175 LBS | BODY MASS INDEX: 22.46 KG/M2

## 2018-09-25 DIAGNOSIS — M51.36 DDD (DEGENERATIVE DISC DISEASE), LUMBAR: Primary | ICD-10-CM

## 2018-09-25 DIAGNOSIS — R20.2 PARESTHESIA OF BOTH LOWER EXTREMITIES: ICD-10-CM

## 2018-09-25 DIAGNOSIS — M48.061 LUMBAR FORAMINAL STENOSIS: ICD-10-CM

## 2018-09-25 PROCEDURE — 99213 OFFICE O/P EST LOW 20 MIN: CPT | Performed by: PHYSICAL MEDICINE & REHABILITATION

## 2018-09-25 NOTE — PROGRESS NOTES
56 0 - 150 mg/dL    HDL 81 (H) 40 - 60 mg/dL    LDL Calculated 109 (H) <100 mg/dL    VLDL Cholesterol Calculated 11 Not Established mg/dL   Comprehensive Metabolic Panel   Result Value Ref Range    Sodium 140 136 - 145 mmol/L    Potassium 4.7 3.5 - 5.1 mmol/L    Chloride 101 99 - 110 mmol/L    CO2 28 21 - 32 mmol/L    Anion Gap 11 3 - 16    Glucose 107 (H) 70 - 99 mg/dL    BUN 20 7 - 20 mg/dL    CREATININE 0.7 (L) 0.9 - 1.3 mg/dL    GFR Non-African American >60 >60    GFR African American >60 >60    Calcium 8.7 8.3 - 10.6 mg/dL    Total Protein 7.2 6.4 - 8.2 g/dL    Alb 4.4 3.4 - 5.0 g/dL    Albumin/Globulin Ratio 1.6 1.1 - 2.2    Total Bilirubin 0.3 0.0 - 1.0 mg/dL    Alkaline Phosphatase 72 40 - 129 U/L    ALT 28 10 - 40 U/L    AST 27 15 - 37 U/L    Globulin 2.8 g/dL   TSH without Reflex   Result Value Ref Range    TSH 1.24 0.27 - 4.20 uIU/mL   Vitamin D 25 Hydroxy   Result Value Ref Range    Vit D, 25-Hydroxy 53.4 >=30 ng/mL     Impression:       1. DDD (degenerative disc disease), lumbar    2. Lumbar foraminal stenosis    3. Paresthesia of both lower extremities        Plan:  Clinical Course: Above diagnoses are worsening    I discussed the diagnosis and the treatment options with Abhilash Guillory today. In Summary:  The various treatment options were outlined and discussed with Abhilash Guillroy including:  Conservative care options: physical therapy, ice, medications, bracing, and activity modification. The indications for therapeutic injections. The indications for additional imaging/laboratory studies. The indications for (possible future) interventions. After considering the various options discussed, Abhilash Guillory elected to pursue a course of treatment that includes the followin. Medications:  Continue anti-inflammatories with appropriate GI Precautions including to stop if develop dark tarry stools or GI upset and to take with food. He was counseled not to take Mobic and ibuprofen together.   He

## 2018-10-02 ENCOUNTER — TELEPHONE (OUTPATIENT)
Dept: ORTHOPEDIC SURGERY | Age: 58
End: 2018-10-02

## 2018-10-02 ENCOUNTER — OFFICE VISIT (OUTPATIENT)
Dept: ORTHOPEDIC SURGERY | Age: 58
End: 2018-10-02
Payer: COMMERCIAL

## 2018-10-02 VITALS — BODY MASS INDEX: 23.7 KG/M2 | HEIGHT: 72 IN | WEIGHT: 175 LBS

## 2018-10-02 DIAGNOSIS — M16.0 PRIMARY OSTEOARTHRITIS OF BOTH HIPS: Primary | ICD-10-CM

## 2018-10-02 DIAGNOSIS — M43.16 SPONDYLOLISTHESIS OF LUMBAR REGION: ICD-10-CM

## 2018-10-02 DIAGNOSIS — M48.061 SPINAL STENOSIS OF LUMBAR REGION WITHOUT NEUROGENIC CLAUDICATION: ICD-10-CM

## 2018-10-02 DIAGNOSIS — M51.26 HNP (HERNIATED NUCLEUS PULPOSUS), LUMBAR: ICD-10-CM

## 2018-10-02 PROCEDURE — 99213 OFFICE O/P EST LOW 20 MIN: CPT | Performed by: PHYSICAL MEDICINE & REHABILITATION

## 2018-10-02 NOTE — PROGRESS NOTES
1.2014    HERNIA REPAIR      bialt    SHOULDER SURGERY Left     cyst and labrum tear repair    VASECTOMY       Current Medications:     Current Outpatient Prescriptions:     sertraline (ZOLOFT) 25 MG tablet, Take 1 tablet by mouth daily, Disp: 90 tablet, Rfl: 1    meloxicam (MOBIC) 15 MG tablet, Take 1 tablet by mouth daily as needed for Pain, Disp: 90 tablet, Rfl: 1    traMADol (ULTRAM) 50 MG tablet, Take 50 mg by mouth daily as needed for Pain., Disp: , Rfl:     Cholecalciferol (VITAMIN D) 2000 UNITS CAPS capsule, Take by mouth PATIENT ONLY TAKES DURING WINTER MONTHS, Disp: , Rfl:   Allergies:  Patient has no known allergies. Social History:    reports that he has never smoked. He has never used smokeless tobacco. He reports that he drinks alcohol. Family History:   Family History   Problem Relation Age of Onset    High Blood Pressure Mother    [facility] Cancer Mother         colon    Cancer Father         lung, kidney    High Blood Pressure Sister     Heart Disease Brother     Early Death Brother     High Blood Pressure Brother     High Blood Pressure Brother        REVIEW OF SYSTEMS:   CONSTITUTIONAL: Denies unexplained weight loss, fevers, chills or fatigue  NEUROLOGICAL: Denies unsteady gait or progressive weakness  MUSCULOSKELETAL: Denies joint swelling or redness  GI: Denies nausea, vomiting, diarrhea   : Denies bowel or bladder issues       PHYSICAL EXAM:    Vitals: Height 6' (1.829 m), weight 175 lb (79.4 kg). GENERAL EXAM:  · General Apparence: Patient is adequately groomed with no evidence of malnutrition. · Psychiatric: Orientation: The patient is oriented to time, place and person. The patient's mood and affect are appropriate   · Vascular: Examination reveals no swelling and palpation reveals no tenderness in upper or lower extremities. Good capillary refill.    · The lymphatic examination of the neck, axillae and groin reveals all areas to be without enlargement or right S1 nerve    roots. 4. L5 chronic spondylolysis.  Moderate bilateral hip arthropathy, hips partially characterized;    arthropathy more prominent on right. 5. Moderate to marked right hip arthrosis; mild left hip arthrosis.  Sequela of small depressed    right femoral head cortical/subcortical fracture versus arthropathic slight flattening of    superior right femoral head       Results for orders placed or performed in visit on 03/07/18   Lipid Panel   Result Value Ref Range    Cholesterol, Total 201 (H) 0 - 199 mg/dL    Triglycerides 56 0 - 150 mg/dL    HDL 81 (H) 40 - 60 mg/dL    LDL Calculated 109 (H) <100 mg/dL    VLDL Cholesterol Calculated 11 Not Established mg/dL   Comprehensive Metabolic Panel   Result Value Ref Range    Sodium 140 136 - 145 mmol/L    Potassium 4.7 3.5 - 5.1 mmol/L    Chloride 101 99 - 110 mmol/L    CO2 28 21 - 32 mmol/L    Anion Gap 11 3 - 16    Glucose 107 (H) 70 - 99 mg/dL    BUN 20 7 - 20 mg/dL    CREATININE 0.7 (L) 0.9 - 1.3 mg/dL    GFR Non-African American >60 >60    GFR African American >60 >60    Calcium 8.7 8.3 - 10.6 mg/dL    Total Protein 7.2 6.4 - 8.2 g/dL    Alb 4.4 3.4 - 5.0 g/dL    Albumin/Globulin Ratio 1.6 1.1 - 2.2    Total Bilirubin 0.3 0.0 - 1.0 mg/dL    Alkaline Phosphatase 72 40 - 129 U/L    ALT 28 10 - 40 U/L    AST 27 15 - 37 U/L    Globulin 2.8 g/dL   TSH without Reflex   Result Value Ref Range    TSH 1.24 0.27 - 4.20 uIU/mL   Vitamin D 25 Hydroxy   Result Value Ref Range    Vit D, 25-Hydroxy 53.4 >=30 ng/mL     Impression:       1. Primary osteoarthritis of both hips    2. Spinal stenosis of lumbar region without neurogenic claudication    3. Spondylolisthesis of lumbar region    4. HNP (herniated nucleus pulposus), lumbar        Plan:  Clinical Course: Above diagnoses are worsening    I discussed the diagnosis and the treatment options with Thomas Cooper today.      In Summary:  The various treatment options were outlined and discussed with Thomas Cooper

## 2018-10-16 ENCOUNTER — TELEPHONE (OUTPATIENT)
Dept: ORTHOPEDIC SURGERY | Age: 58
End: 2018-10-16

## 2018-10-16 NOTE — TELEPHONE ENCOUNTER
DOS   10/19/2018  CPT   83846.50  83756.26   83652.26   03271   NPR  DX   M16.0  M48.061   M43.16   M51.26  OP SX AUTH  [id number]  VALID   10/16/2018 - 11/16/2018  PROCEDURE   BILATERAL HIP INJECTIONS  DR. Huitron [facility]  INSURANCE:   Woody Pulido

## 2018-10-19 ENCOUNTER — APPOINTMENT (OUTPATIENT)
Dept: GENERAL RADIOLOGY | Age: 58
End: 2018-10-19
Attending: PHYSICAL MEDICINE & REHABILITATION
Payer: COMMERCIAL

## 2018-10-19 ENCOUNTER — HOSPITAL ENCOUNTER (OUTPATIENT)
Age: 58
Setting detail: OUTPATIENT SURGERY
Discharge: HOME | End: 2018-10-19
Attending: PHYSICAL MEDICINE & REHABILITATION | Admitting: PHYSICAL MEDICINE & REHABILITATION
Payer: COMMERCIAL

## 2018-10-19 VITALS
BODY MASS INDEX: 23.7 KG/M2 | SYSTOLIC BLOOD PRESSURE: 127 MMHG | HEIGHT: 72 IN | RESPIRATION RATE: 16 BRPM | DIASTOLIC BLOOD PRESSURE: 59 MMHG | HEART RATE: 70 BPM | WEIGHT: 175 LBS | OXYGEN SATURATION: 100 % | TEMPERATURE: 98 F

## 2018-10-19 PROCEDURE — 99152 MOD SED SAME PHYS/QHP 5/>YRS: CPT | Performed by: PHYSICAL MEDICINE & REHABILITATION

## 2018-10-19 PROCEDURE — 6360000002 HC RX W HCPCS: Performed by: PHYSICAL MEDICINE & REHABILITATION

## 2018-10-19 PROCEDURE — 2709999900 HC NON-CHARGEABLE SUPPLY: Performed by: PHYSICAL MEDICINE & REHABILITATION

## 2018-10-19 PROCEDURE — 77002 NEEDLE LOCALIZATION BY XRAY: CPT

## 2018-10-19 PROCEDURE — 2500000003 HC RX 250 WO HCPCS: Performed by: PHYSICAL MEDICINE & REHABILITATION

## 2018-10-19 PROCEDURE — 3610000056 HC PAIN LEVEL 4 BASE (NON-OR): Performed by: PHYSICAL MEDICINE & REHABILITATION

## 2018-10-19 RX ORDER — SODIUM CHLORIDE 9 MG/ML
1000 INJECTION, SOLUTION INTRAVENOUS ONCE
Status: DISCONTINUED | OUTPATIENT
Start: 2018-10-19 | End: 2018-10-19 | Stop reason: HOSPADM

## 2018-10-19 RX ORDER — METHYLPREDNISOLONE ACETATE 80 MG/ML
INJECTION, SUSPENSION INTRA-ARTICULAR; INTRALESIONAL; INTRAMUSCULAR; SOFT TISSUE
Status: COMPLETED | OUTPATIENT
Start: 2018-10-19 | End: 2018-10-19

## 2018-10-19 RX ORDER — BUPIVACAINE HYDROCHLORIDE 5 MG/ML
INJECTION, SOLUTION PERINEURAL
Status: COMPLETED | OUTPATIENT
Start: 2018-10-19 | End: 2018-10-19

## 2018-10-19 RX ORDER — MIDAZOLAM HYDROCHLORIDE 1 MG/ML
INJECTION INTRAMUSCULAR; INTRAVENOUS
Status: COMPLETED | OUTPATIENT
Start: 2018-10-19 | End: 2018-10-19

## 2018-10-19 ASSESSMENT — PAIN - FUNCTIONAL ASSESSMENT: PAIN_FUNCTIONAL_ASSESSMENT: 0-10

## 2018-10-19 ASSESSMENT — PAIN DESCRIPTION - DESCRIPTORS: DESCRIPTORS: SHARP;THROBBING

## 2018-10-19 ASSESSMENT — PAIN SCALES - GENERAL
PAINLEVEL_OUTOF10: 0
PAINLEVEL_OUTOF10: 0

## 2018-11-06 ENCOUNTER — OFFICE VISIT (OUTPATIENT)
Dept: ORTHOPEDIC SURGERY | Age: 58
End: 2018-11-06
Payer: COMMERCIAL

## 2018-11-06 VITALS — BODY MASS INDEX: 23.7 KG/M2 | WEIGHT: 175 LBS | HEIGHT: 72 IN

## 2018-11-06 DIAGNOSIS — M47.816 SPONDYLOSIS OF LUMBAR REGION WITHOUT MYELOPATHY OR RADICULOPATHY: ICD-10-CM

## 2018-11-06 DIAGNOSIS — M16.0 PRIMARY OSTEOARTHRITIS OF BOTH HIPS: Primary | ICD-10-CM

## 2018-11-06 DIAGNOSIS — M25.552 PAIN OF BOTH HIP JOINTS: ICD-10-CM

## 2018-11-06 DIAGNOSIS — M25.551 PAIN OF BOTH HIP JOINTS: ICD-10-CM

## 2018-11-06 PROCEDURE — 99213 OFFICE O/P EST LOW 20 MIN: CPT | Performed by: PHYSICAL MEDICINE & REHABILITATION

## 2018-11-06 NOTE — PROGRESS NOTES
12/17/2014    Depression     External hemorrhoid       Past Surgical History:     Past Surgical History:   Procedure Laterality Date    APPENDECTOMY      COLONOSCOPY  1.2014    HERNIA REPAIR      bialt    NH ARTHROCENTESIS ASPIR&/INJ MAJOR JT/BURSA W/O US Bilateral 10/19/2018    BILATERAL HIP INJECTION WITH FLUOROSCOPY performed by Patty Sanchez MD at [address] Left     cyst and labrum tear repair    VASECTOMY       Current Medications:     Current Outpatient Prescriptions:     sertraline (ZOLOFT) 25 MG tablet, Take 1 tablet by mouth daily, Disp: 90 tablet, Rfl: 1    meloxicam (MOBIC) 15 MG tablet, Take 1 tablet by mouth daily as needed for Pain, Disp: 90 tablet, Rfl: 1    traMADol (ULTRAM) 50 MG tablet, Take 50 mg by mouth daily as needed for Pain., Disp: , Rfl:     Cholecalciferol (VITAMIN D) 2000 UNITS CAPS capsule, Take by mouth PATIENT ONLY TAKES DURING WINTER MONTHS, Disp: , Rfl:   Allergies:  Patient has no known allergies. Social History:    reports that he has never smoked. He has never used smokeless tobacco. He reports that he drinks alcohol. He reports that he does not use drugs. Family History:   Family History   Problem Relation Age of Onset    High Blood Pressure Mother    [place] Cancer Mother         colon    Cancer Father         lung, kidney    High Blood Pressure Sister     Heart Disease Brother     Early Death Brother     High Blood Pressure Brother     High Blood Pressure Brother        REVIEW OF SYSTEMS:   CONSTITUTIONAL: Denies unexplained weight loss, fevers, chills or fatigue  NEUROLOGICAL: Denies unsteady gait or progressive weakness  MUSCULOSKELETAL: Denies joint swelling or redness  GI: Denies nausea, vomiting, diarrhea   : Denies bowel or bladder issues       PHYSICAL EXAM:    Vitals: Height 6' (1.829 m), weight 175 lb (79.4 kg). GENERAL EXAM:  · General Apparence: Patient is adequately groomed with no evidence of malnutrition.   · Psychiatric:

## 2019-01-22 ENCOUNTER — TELEPHONE (OUTPATIENT)
Dept: INTERNAL MEDICINE CLINIC | Age: 59
End: 2019-01-22

## 2019-01-22 DIAGNOSIS — F32.0 MILD SINGLE CURRENT EPISODE OF MAJOR DEPRESSIVE DISORDER (HCC): ICD-10-CM

## 2019-01-22 DIAGNOSIS — M16.11 PRIMARY OSTEOARTHRITIS OF RIGHT HIP: ICD-10-CM

## 2019-01-22 RX ORDER — MELOXICAM 15 MG/1
15 TABLET ORAL DAILY PRN
Qty: 30 TABLET | Refills: 0 | Status: SHIPPED | OUTPATIENT
Start: 2019-01-22 | End: 2019-01-30 | Stop reason: SDUPTHER

## 2019-01-22 RX ORDER — SERTRALINE HYDROCHLORIDE 25 MG/1
25 TABLET, FILM COATED ORAL DAILY
Qty: 30 TABLET | Refills: 0 | Status: SHIPPED | OUTPATIENT
Start: 2019-01-22 | End: 2019-01-30 | Stop reason: SDUPTHER

## 2019-01-30 ENCOUNTER — OFFICE VISIT (OUTPATIENT)
Dept: INTERNAL MEDICINE CLINIC | Age: 59
End: 2019-01-30
Payer: COMMERCIAL

## 2019-01-30 VITALS
SYSTOLIC BLOOD PRESSURE: 136 MMHG | WEIGHT: 178 LBS | DIASTOLIC BLOOD PRESSURE: 70 MMHG | HEIGHT: 72 IN | BODY MASS INDEX: 24.11 KG/M2

## 2019-01-30 DIAGNOSIS — Z00.129 LABORATORY EXAMINATION ORDERED AS PART OF A ROUTINE GENERAL MEDICAL EXAMINATION IN PEDIATRIC PATIENT: ICD-10-CM

## 2019-01-30 DIAGNOSIS — F32.0 MILD SINGLE CURRENT EPISODE OF MAJOR DEPRESSIVE DISORDER (HCC): ICD-10-CM

## 2019-01-30 DIAGNOSIS — Z12.11 ENCOUNTER FOR SCREENING COLONOSCOPY: ICD-10-CM

## 2019-01-30 DIAGNOSIS — M16.11 PRIMARY OSTEOARTHRITIS OF RIGHT HIP: Primary | ICD-10-CM

## 2019-01-30 PROCEDURE — 99203 OFFICE O/P NEW LOW 30 MIN: CPT | Performed by: INTERNAL MEDICINE

## 2019-01-30 RX ORDER — SERTRALINE HYDROCHLORIDE 25 MG/1
25 TABLET, FILM COATED ORAL DAILY
Qty: 30 TABLET | Refills: 5 | Status: SHIPPED | OUTPATIENT
Start: 2019-01-30 | End: 2019-09-11 | Stop reason: SDUPTHER

## 2019-01-30 RX ORDER — MELOXICAM 15 MG/1
15 TABLET ORAL DAILY PRN
Qty: 30 TABLET | Refills: 5 | Status: SHIPPED | OUTPATIENT
Start: 2019-01-30 | End: 2019-10-17 | Stop reason: SDUPTHER

## 2019-01-30 RX ORDER — AMITRIPTYLINE HYDROCHLORIDE 10 MG/1
10 TABLET, FILM COATED ORAL NIGHTLY
Qty: 30 TABLET | Refills: 5 | Status: SHIPPED | OUTPATIENT
Start: 2019-01-30 | End: 2019-09-11 | Stop reason: SDUPTHER

## 2019-01-30 ASSESSMENT — PATIENT HEALTH QUESTIONNAIRE - PHQ9
SUM OF ALL RESPONSES TO PHQ9 QUESTIONS 1 & 2: 0
SUM OF ALL RESPONSES TO PHQ QUESTIONS 1-9: 0
2. FEELING DOWN, DEPRESSED OR HOPELESS: 0
1. LITTLE INTEREST OR PLEASURE IN DOING THINGS: 0
SUM OF ALL RESPONSES TO PHQ QUESTIONS 1-9: 0

## 2019-02-06 ENCOUNTER — OFFICE VISIT (OUTPATIENT)
Dept: ORTHOPEDIC SURGERY | Age: 59
End: 2019-02-06
Payer: COMMERCIAL

## 2019-02-06 VITALS
WEIGHT: 177.91 LBS | HEART RATE: 82 BPM | HEIGHT: 72 IN | DIASTOLIC BLOOD PRESSURE: 80 MMHG | BODY MASS INDEX: 24.1 KG/M2 | SYSTOLIC BLOOD PRESSURE: 130 MMHG

## 2019-02-06 DIAGNOSIS — M70.61 GREATER TROCHANTERIC BURSITIS OF RIGHT HIP: ICD-10-CM

## 2019-02-06 DIAGNOSIS — M16.11 PRIMARY OSTEOARTHRITIS OF RIGHT HIP: Primary | ICD-10-CM

## 2019-02-06 DIAGNOSIS — M51.36 DDD (DEGENERATIVE DISC DISEASE), LUMBAR: ICD-10-CM

## 2019-02-06 PROCEDURE — 99214 OFFICE O/P EST MOD 30 MIN: CPT | Performed by: PHYSICAL MEDICINE & REHABILITATION

## 2019-02-12 ENCOUNTER — TELEPHONE (OUTPATIENT)
Dept: ORTHOPEDIC SURGERY | Age: 59
End: 2019-02-12

## 2019-02-12 DIAGNOSIS — M70.61 GREATER TROCHANTERIC BURSITIS OF RIGHT HIP: ICD-10-CM

## 2019-02-12 DIAGNOSIS — M51.36 DDD (DEGENERATIVE DISC DISEASE), LUMBAR: ICD-10-CM

## 2019-02-12 DIAGNOSIS — M16.11 PRIMARY OSTEOARTHRITIS OF RIGHT HIP: Primary | ICD-10-CM

## 2019-02-13 RX ORDER — TRAMADOL HYDROCHLORIDE 50 MG/1
50 TABLET ORAL 3 TIMES DAILY PRN
Qty: 21 TABLET | Refills: 0 | OUTPATIENT
Start: 2019-02-13 | End: 2019-02-20

## 2019-02-20 ENCOUNTER — OFFICE VISIT (OUTPATIENT)
Dept: ORTHOPEDIC SURGERY | Age: 59
End: 2019-02-20
Payer: COMMERCIAL

## 2019-02-20 VITALS — HEIGHT: 72 IN | WEIGHT: 177.91 LBS | BODY MASS INDEX: 24.1 KG/M2

## 2019-02-20 DIAGNOSIS — M70.61 GREATER TROCHANTERIC BURSITIS OF RIGHT HIP: ICD-10-CM

## 2019-02-20 DIAGNOSIS — M16.11 PRIMARY OSTEOARTHRITIS OF RIGHT HIP: Primary | ICD-10-CM

## 2019-02-20 PROCEDURE — 20611 DRAIN/INJ JOINT/BURSA W/US: CPT | Performed by: PHYSICAL MEDICINE & REHABILITATION

## 2019-02-20 PROCEDURE — PRPINJ PRP INJECTION: Performed by: PHYSICAL MEDICINE & REHABILITATION

## 2019-02-20 RX ORDER — TRAMADOL HYDROCHLORIDE 50 MG/1
50 TABLET ORAL 3 TIMES DAILY PRN
Qty: 21 TABLET | Refills: 0 | Status: SHIPPED | OUTPATIENT
Start: 2019-02-20 | End: 2019-02-27

## 2019-02-21 ENCOUNTER — TELEPHONE (OUTPATIENT)
Dept: ORTHOPEDIC SURGERY | Age: 59
End: 2019-02-21

## 2019-02-21 DIAGNOSIS — M16.11 PRIMARY OSTEOARTHRITIS OF RIGHT HIP: Primary | ICD-10-CM

## 2019-02-21 DIAGNOSIS — M70.61 GREATER TROCHANTERIC BURSITIS OF RIGHT HIP: ICD-10-CM

## 2019-02-21 RX ORDER — HYDROCODONE BITARTRATE AND ACETAMINOPHEN 5; 325 MG/1; MG/1
1 TABLET ORAL 3 TIMES DAILY PRN
Qty: 21 TABLET | Refills: 0 | Status: SHIPPED | OUTPATIENT
Start: 2019-02-21 | End: 2019-02-28

## 2019-03-19 DIAGNOSIS — Z00.129 LABORATORY EXAMINATION ORDERED AS PART OF A ROUTINE GENERAL MEDICAL EXAMINATION IN PEDIATRIC PATIENT: ICD-10-CM

## 2019-03-19 LAB
A/G RATIO: 1.3 (ref 1.1–2.2)
ALBUMIN SERPL-MCNC: 4.1 G/DL (ref 3.4–5)
ALP BLD-CCNC: 84 U/L (ref 40–129)
ALT SERPL-CCNC: 13 U/L (ref 10–40)
ANION GAP SERPL CALCULATED.3IONS-SCNC: 10 MMOL/L (ref 3–16)
AST SERPL-CCNC: 17 U/L (ref 15–37)
BILIRUB SERPL-MCNC: 0.3 MG/DL (ref 0–1)
BUN BLDV-MCNC: 14 MG/DL (ref 7–20)
CALCIUM SERPL-MCNC: 9.2 MG/DL (ref 8.3–10.6)
CHLORIDE BLD-SCNC: 103 MMOL/L (ref 99–110)
CHOLESTEROL, TOTAL: 172 MG/DL (ref 0–199)
CO2: 29 MMOL/L (ref 21–32)
CREAT SERPL-MCNC: 0.8 MG/DL (ref 0.8–1.3)
GFR AFRICAN AMERICAN: >60
GFR NON-AFRICAN AMERICAN: >60
GLOBULIN: 3.2 G/DL
GLUCOSE BLD-MCNC: 105 MG/DL (ref 70–99)
HDLC SERPL-MCNC: 69 MG/DL (ref 40–60)
LDL CHOLESTEROL CALCULATED: 93 MG/DL
POTASSIUM SERPL-SCNC: 4.9 MMOL/L (ref 3.5–5.1)
SODIUM BLD-SCNC: 142 MMOL/L (ref 136–145)
TOTAL PROTEIN: 7.3 G/DL (ref 6.4–8.2)
TRIGL SERPL-MCNC: 51 MG/DL (ref 0–150)
VLDLC SERPL CALC-MCNC: 10 MG/DL

## 2019-04-01 ENCOUNTER — OFFICE VISIT (OUTPATIENT)
Dept: INTERNAL MEDICINE CLINIC | Age: 59
End: 2019-04-01
Payer: COMMERCIAL

## 2019-04-01 VITALS
WEIGHT: 171 LBS | HEIGHT: 72 IN | SYSTOLIC BLOOD PRESSURE: 130 MMHG | BODY MASS INDEX: 23.16 KG/M2 | DIASTOLIC BLOOD PRESSURE: 80 MMHG

## 2019-04-01 DIAGNOSIS — M16.11 PRIMARY OSTEOARTHRITIS OF RIGHT HIP: ICD-10-CM

## 2019-04-01 DIAGNOSIS — Z00.00 ROUTINE GENERAL MEDICAL EXAMINATION AT A HEALTH CARE FACILITY: Primary | ICD-10-CM

## 2019-04-01 DIAGNOSIS — F32.0 MILD SINGLE CURRENT EPISODE OF MAJOR DEPRESSIVE DISORDER (HCC): ICD-10-CM

## 2019-04-01 PROCEDURE — 99396 PREV VISIT EST AGE 40-64: CPT | Performed by: INTERNAL MEDICINE

## 2019-04-01 ASSESSMENT — PATIENT HEALTH QUESTIONNAIRE - PHQ9
SUM OF ALL RESPONSES TO PHQ QUESTIONS 1-9: 0
2. FEELING DOWN, DEPRESSED OR HOPELESS: 0
1. LITTLE INTEREST OR PLEASURE IN DOING THINGS: 0
SUM OF ALL RESPONSES TO PHQ9 QUESTIONS 1 & 2: 0
SUM OF ALL RESPONSES TO PHQ QUESTIONS 1-9: 0

## 2019-04-01 NOTE — PROGRESS NOTES
2019    Mally Irizarry (:  1960) rio 62 y.o. male, here for a preventive medicine evaluation. Patient Active Problem List   Diagnosis    Allergic rhinitis    Anxiety    External hemorrhoid    Sciatica    Degenerative joint disease of right hip    Fam hx-ischem heart disease    Decreased libido    Degenerative joint disease (DJD) of lumbar spine    Degenerative joint disease involving multiple joints    Vitamin D deficiency    Restless leg syndrome    Mild single current episode of major depressive disorder ([address]    Herpes zoster without complication     Presenting for physical.    Overall doing well. Tried the injection for his right hip but did not work well. Finds the meloxicam does help, but plans to have hip replaced likely this summer. Saw Dr. Manolo Phoenix (orthopedist). Mood good on current dose of zoloft. UTD on dental exams. Needs to see eye doctor, he will schedule. Review of Systems   Constitutional: Negative for fatigue. Musculoskeletal: Positive for arthralgias. Prior to Visit Medications    Medication Sig Taking?  Authorizing Provider   sertraline (ZOLOFT) 25 MG tablet Take 1 tablet by mouth daily Yes Darren Hooks MD   meloxicam (MOBIC) 15 MG tablet Take 1 tablet by mouth daily as needed for Pain Yes Darren Hooks MD   amitriptyline (ELAVIL) 10 MG tablet Take 1 tablet by mouth nightly Yes Darren Hooks MD   Cholecalciferol (VITAMIN D) 2000 UNITS CAPS capsule Take by mouth PATIENT ONLY TAKES DURING WINTER MONTHS Yes Historical Provider, MD        No Known Allergies    Past Medical History:   Diagnosis Date    Allergic rhinitis     Anxiety     Chicken pox     Degenerative joint disease (DJD) of lumbar spine 2014    Depression     External hemorrhoid        Past Surgical History:   Procedure Laterality Date    APPENDECTOMY      COLONOSCOPY      HERNIA REPAIR      bialt    [place] ARTHROCENTESIS ASPIR&/INJ MAJOR JT/BURSA W/O US Bilateral 10/19/2018 (1.829 m)     Estimated body mass index is 23.19 kg/m² as calculated from the following:    Height as of this encounter: 6' (1.829 m). Weight as of this encounter: 171 lb (77.6 kg). Physical Exam   Constitutional: He is oriented to person, place, and time. He appears well-developed and well-nourished. He is cooperative. No distress. HENT:   Head: Normocephalic. Right Ear: Tympanic membrane and external ear normal.   Left Ear: Tympanic membrane and external ear normal.   Nose: Nose normal.   Mouth/Throat: Uvula is midline, oropharynx is clear and moist and mucous membranes are normal. Normal dentition. Eyes: Pupils are equal, round, and reactive to light. Conjunctivae and EOM are normal. No scleral icterus. Neck: Neck supple. No JVD present. No tracheal deviation present. No thyroid mass and no thyromegaly present. Cardiovascular: Normal rate, regular rhythm, S1 normal and S2 normal. Exam reveals no gallop and no friction rub. No murmur heard. Pulmonary/Chest: Effort normal and breath sounds normal. No respiratory distress. He has no wheezes. He has no rales. Abdominal: Soft. Bowel sounds are normal. He exhibits no distension and no mass. There is no tenderness. Genitourinary: Rectum normal, prostate normal, testes normal and penis normal.   Musculoskeletal: Normal range of motion. He exhibits no edema or deformity. Lymphadenopathy:     He has no cervical adenopathy. Neurological: He is alert and oriented to person, place, and time. He has normal reflexes. No cranial nerve deficit or sensory deficit. He exhibits normal muscle tone. Coordination normal.   Skin: Skin is warm and dry. No rash noted. No erythema. Psychiatric: He has a normal mood and affect. His speech is normal and behavior is normal. Judgment and thought content normal. Cognition and memory are normal.       No flowsheet data found.     Lab Results   Component Value Date    CHOL 172 03/19/2019    CHOL 201 03/07/2018    CHOL 168 04/27/2017    TRIG 51 03/19/2019    TRIG 56 03/07/2018    TRIG 45 04/27/2017    HDL 69 03/19/2019    HDL 81 03/07/2018    HDL 82 04/27/2017    LDLCALC 93 03/19/2019    LDLCALC 109 03/07/2018    LDLCALC 77 04/27/2017    GLUCOSE 105 03/19/2019       The 10-year ASCVD risk score (Amelia Aranad, et al., 2013) is: 6.7%    Values used to calculate the score:      Age: 62 years      Sex: Male      Is Non- : No      Diabetic: No      Tobacco smoker: No      Systolic Blood Pressure: 006 mmHg      Is BP treated: No      HDL Cholesterol: 69 mg/dL      Total Cholesterol: 172 mg/dL    Immunization History   Administered Date(s) Administered    Influenza Virus Vaccine 10/16/2018    Tdap (Boostrix, Adacel) 03/24/2015       Health Maintenance   Topic Date Due    Colon cancer screen colonoscopy  01/14/2019    Shingles Vaccine (1 of 2) 04/01/2020 (Originally 3/12/2008)    Lipid screen  03/19/2024    DTaP/Tdap/Td vaccine (2 - Td) 03/24/2025    Flu vaccine  Completed    Hepatitis C screen  Completed    HIV screen  Completed       ASSESSMENT/PLAN:  1. Routine general medical examination at a health care facility  - Discussed age appropriate preventive care including healthy diet, daily exercise, immunizations and age & gender guided screening tests. 2. Primary osteoarthritis of right hip  - planning to have DEANNA later this year  - continue current management    3. Mild single current episode of major depressive disorder ([facility] [address]  - stable  - continue current medication      No follow-ups on file.

## 2019-05-15 ENCOUNTER — OFFICE VISIT (OUTPATIENT)
Dept: DERMATOLOGY | Age: 59
End: 2019-05-15
Payer: COMMERCIAL

## 2019-05-15 DIAGNOSIS — L57.0 ACTINIC KERATOSIS: Primary | ICD-10-CM

## 2019-05-15 DIAGNOSIS — D18.00 ANGIOMA: ICD-10-CM

## 2019-05-15 PROCEDURE — 99213 OFFICE O/P EST LOW 20 MIN: CPT | Performed by: DERMATOLOGY

## 2019-05-15 NOTE — PROGRESS NOTES
Formerly [facility]  Nicki Spear MD  [address] [address] [facility]  1960    62 y.o. male     Date of Visit: 5/15/2019    Chief Complaint: skin lesions    History of Present Illness:    1. F/u for a hx of facial AKs - has few stable asymptomatic lesions on the face. None are bothersome. 2.  He has stable asymptomatic lesions on the trunk. Review of Systems:  Skin: No new or changing moles. Past Medical History, Family History, Surgical History, Medications and Allergies reviewed. Past Medical History:   Diagnosis Date    Allergic rhinitis     Anxiety     Chicken pox     Degenerative joint disease (DJD) of lumbar spine 12/17/2014    Depression     External hemorrhoid      Past Surgical History:   Procedure Laterality Date    APPENDECTOMY      COLONOSCOPY  1.2014    HERNIA REPAIR      bialt    [place] ARTHROCENTESIS ASPIR&/INJ MAJOR JT/BURSA W/O US Bilateral 10/19/2018    BILATERAL HIP INJECTION WITH FLUOROSCOPY performed by Blank Cunningham MD at [address] Left     cyst and labrum tear repair    VASECTOMY         No Known Allergies  Outpatient Medications Marked as Taking for the 5/15/19 encounter (Office Visit) with Familia Tarango MD   Medication Sig Dispense Refill    sertraline (ZOLOFT) 25 MG tablet Take 1 tablet by mouth daily 30 tablet 5    meloxicam (MOBIC) 15 MG tablet Take 1 tablet by mouth daily as needed for Pain 30 tablet 5    amitriptyline (ELAVIL) 10 MG tablet Take 1 tablet by mouth nightly 30 tablet 5    Cholecalciferol (VITAMIN D) 2000 UNITS CAPS capsule Take by mouth PATIENT ONLY TAKES DURING WINTER MONTHS         Physical Examination       The following were examined and determined to be normal: Psych/Neuro, Scalp/hair, Conjunctivae/eyelids, Gums/teeth/lips, Neck, Breast/axilla/chest, Abdomen, Back, RUE, LUE, RLE, LLE and Nails/digits. The following were examined and determined to be abnormal: Head/face.

## 2019-07-29 ENCOUNTER — OFFICE VISIT (OUTPATIENT)
Dept: INTERNAL MEDICINE CLINIC | Age: 59
End: 2019-07-29
Payer: COMMERCIAL

## 2019-07-29 VITALS
BODY MASS INDEX: 23.7 KG/M2 | DIASTOLIC BLOOD PRESSURE: 90 MMHG | SYSTOLIC BLOOD PRESSURE: 140 MMHG | HEIGHT: 72 IN | WEIGHT: 175 LBS

## 2019-07-29 DIAGNOSIS — Z01.818 PREOP EXAMINATION: Primary | ICD-10-CM

## 2019-07-29 DIAGNOSIS — Z01.818 PREOP EXAMINATION: ICD-10-CM

## 2019-07-29 LAB
ANION GAP SERPL CALCULATED.3IONS-SCNC: 11 MMOL/L (ref 3–16)
BUN BLDV-MCNC: 19 MG/DL (ref 7–20)
CALCIUM SERPL-MCNC: 9.3 MG/DL (ref 8.3–10.6)
CHLORIDE BLD-SCNC: 103 MMOL/L (ref 99–110)
CO2: 27 MMOL/L (ref 21–32)
CREAT SERPL-MCNC: 0.8 MG/DL (ref 0.8–1.3)
GFR AFRICAN AMERICAN: >60
GFR NON-AFRICAN AMERICAN: >60
GLUCOSE BLD-MCNC: 104 MG/DL (ref 70–99)
POTASSIUM SERPL-SCNC: 5.1 MMOL/L (ref 3.5–5.1)
SODIUM BLD-SCNC: 141 MMOL/L (ref 136–145)

## 2019-07-29 PROCEDURE — 99213 OFFICE O/P EST LOW 20 MIN: CPT | Performed by: INTERNAL MEDICINE

## 2019-07-29 NOTE — PROGRESS NOTES
Preoperative Consultation      Diana Flores  YOB: 1960    Date of Service:  7/29/2019    Vitals:    07/29/19 0901 07/29/19 0906   BP: (!) 140/90 (!) 140/90   Weight: 175 lb (79.4 kg)    Height: 6' (1.829 m)       Wt Readings from Last 2 Encounters:   07/29/19 175 lb (79.4 kg)   04/01/19 171 lb (77.6 kg)     BP Readings from Last 3 Encounters:   07/29/19 (!) 140/90   04/01/19 130/80   02/06/19 130/80        Chief Complaint   Patient presents with    Pre-op Exam     8/7/2019 right hip replacement Dr Soo Sweet @ [facility]     No Known Allergies  Outpatient Medications Marked as Taking for the 7/29/19 encounter (Office Visit) with Cristofer Suazo MD   Medication Sig Dispense Refill    sertraline (ZOLOFT) 25 MG tablet Take 1 tablet by mouth daily 30 tablet 5    meloxicam (MOBIC) 15 MG tablet Take 1 tablet by mouth daily as needed for Pain 30 tablet 5    amitriptyline (ELAVIL) 10 MG tablet Take 1 tablet by mouth nightly 30 tablet 5    Cholecalciferol (VITAMIN D) 2000 UNITS CAPS capsule Take by mouth PATIENT ONLY TAKES DURING WINTER MONTHS         This patient presents to the office today for a preoperative consultation at the request of surgeon, Dr. Chrissy Barrientos, who plans on performing right DEANNA on August 7. The current problem began years ago, and symptoms have been worsening with time. Conservative therapy: has been ineffective.     Planned anesthesia: General   Known anesthesia problems: Past general anesthesia with complications- post-op nausea and vomiting   Bleeding risk: No recent or remote history of abnormal bleeding  Personal or FH of DVT/PE: No    Patient objection to receiving blood products: No    Patient Active Problem List   Diagnosis    Allergic rhinitis    Anxiety    External hemorrhoid    Sciatica    Degenerative joint disease of right hip    Fam hx-ischem heart disease    Decreased libido    Degenerative joint disease (DJD) of lumbar spine    Degenerative joint disease involving multiple joints    Vitamin D deficiency    Restless leg syndrome    Mild single current episode of major depressive disorder (HCC)    Herpes zoster without complication       Past Medical History:   Diagnosis Date    Allergic rhinitis     Anxiety     Chicken pox     Degenerative joint disease (DJD) of lumbar spine 12/17/2014    Depression     External hemorrhoid      Past Surgical History:   Procedure Laterality Date    APPENDECTOMY      COLONOSCOPY  1.2014    HERNIA REPAIR      bialt    [place] ARTHROCENTESIS ASPIR&/INJ MAJOR JT/BURSA W/O US Bilateral 10/19/2018    BILATERAL HIP INJECTION WITH FLUOROSCOPY performed by Hang Yanes MD at [address] Left     cyst and labrum tear repair    VASECTOMY       Family History   Problem Relation Age of Onset    High Blood Pressure Mother     Cancer Mother         colon    Cancer Father         lung, kidney    High Blood Pressure Sister     Heart Disease Brother     Early Death Brother     High Blood Pressure Brother     High Blood Pressure Brother      Social History     Socioeconomic History    Marital status:      Spouse name: Not on file    Number of children: Not on file    Years of education: Not on file    Highest education level: Not on file   Occupational History    Not on file   Social Needs    Financial resource strain: Not on file    Food insecurity:     Worry: Not on file     Inability: Not on file    Transportation needs:     Medical: Not on file     Non-medical: Not on file   Tobacco Use    Smoking status: Never Smoker    Smokeless tobacco: Never Used   Substance and Sexual Activity    Alcohol use: Yes     Comment: weekly    Drug use: No    Sexual activity: Yes   Lifestyle    Physical activity:     Days per week: Not on file     Minutes per session: Not on file    Stress: Not on file   Relationships    Social connections:     Talks on phone: Not on file     Gets together: Not on file     Attends from this visit. Latest known visit with results is:   Orders Only on 03/19/2019   Component Date Value    Cholesterol, Total 03/19/2019 172     Triglycerides 03/19/2019 51     HDL 03/19/2019 69*    LDL Calculated 03/19/2019 93     VLDL Cholesterol Calcula* 03/19/2019 10     Sodium 03/19/2019 142     Potassium 03/19/2019 4.9     Chloride 03/19/2019 103     CO2 03/19/2019 29     Anion Gap 03/19/2019 10     Glucose 03/19/2019 105*    BUN 03/19/2019 14     CREATININE 03/19/2019 0.8     GFR Non- 03/19/2019 >60     GFR  03/19/2019 >60     Calcium 03/19/2019 9.2     Total Protein 03/19/2019 7.3     Alb 03/19/2019 4.1     Albumin/Globulin Ratio 03/19/2019 1.3     Total Bilirubin 03/19/2019 0.3     Alkaline Phosphatase 03/19/2019 84     ALT 03/19/2019 13     AST 03/19/2019 17     Globulin 03/19/2019 3.2            Assessment:       62 y.o. patient with planned surgery as above. Known risk factors for perioperative complications: None  Current medications which may produce withdrawal symptoms if withheld perioperatively: none      Plan:     1. Preoperative workup as follows: electrolytes, creatinine, glucose  2. Change in medication regimen before surgery: Hold all medications on morning of surgery, Discontinue NSAIDs (meloxicam) 7 days before surgery  3. Prophylaxis for cardiac events with perioperative beta-blockers: Not indicated  4. Deep vein thrombosis prophylaxis: regimen to be chosen by surgical team  5.  No contraindications to planned surgery

## 2019-09-11 DIAGNOSIS — F32.0 MILD SINGLE CURRENT EPISODE OF MAJOR DEPRESSIVE DISORDER (HCC): ICD-10-CM

## 2019-09-12 RX ORDER — AMITRIPTYLINE HYDROCHLORIDE 10 MG/1
TABLET, FILM COATED ORAL
Qty: 30 TABLET | Refills: 4 | Status: SHIPPED | OUTPATIENT
Start: 2019-09-12 | End: 2020-03-09

## 2019-09-12 RX ORDER — SERTRALINE HYDROCHLORIDE 25 MG/1
TABLET, FILM COATED ORAL
Qty: 30 TABLET | Refills: 4 | Status: SHIPPED | OUTPATIENT
Start: 2019-09-12 | End: 2020-03-09

## 2019-10-17 DIAGNOSIS — M16.11 PRIMARY OSTEOARTHRITIS OF RIGHT HIP: ICD-10-CM

## 2019-10-18 RX ORDER — MELOXICAM 15 MG/1
TABLET ORAL
Qty: 30 TABLET | Refills: 4 | Status: SHIPPED | OUTPATIENT
Start: 2019-10-18 | End: 2020-04-13

## 2019-11-20 ENCOUNTER — OFFICE VISIT (OUTPATIENT)
Dept: INTERNAL MEDICINE CLINIC | Age: 59
End: 2019-11-20
Payer: COMMERCIAL

## 2019-11-20 VITALS
DIASTOLIC BLOOD PRESSURE: 70 MMHG | SYSTOLIC BLOOD PRESSURE: 128 MMHG | BODY MASS INDEX: 24.3 KG/M2 | HEIGHT: 72 IN | WEIGHT: 179.4 LBS

## 2019-11-20 DIAGNOSIS — M16.12 OSTEOARTHRITIS OF LEFT HIP, UNSPECIFIED OSTEOARTHRITIS TYPE: ICD-10-CM

## 2019-11-20 DIAGNOSIS — Z01.818 PREOP EXAMINATION: Primary | ICD-10-CM

## 2019-11-20 PROCEDURE — 99214 OFFICE O/P EST MOD 30 MIN: CPT | Performed by: INTERNAL MEDICINE

## 2020-03-31 ENCOUNTER — TELEPHONE (OUTPATIENT)
Dept: INTERNAL MEDICINE CLINIC | Age: 60
End: 2020-03-31

## 2020-04-13 RX ORDER — MELOXICAM 15 MG/1
TABLET ORAL
Qty: 30 TABLET | Refills: 3 | Status: SHIPPED | OUTPATIENT
Start: 2020-04-13 | End: 2020-08-20

## 2020-06-06 ENCOUNTER — PATIENT MESSAGE (OUTPATIENT)
Dept: DERMATOLOGY | Age: 60
End: 2020-06-06

## 2020-06-26 ENCOUNTER — OFFICE VISIT (OUTPATIENT)
Dept: INTERNAL MEDICINE CLINIC | Age: 60
End: 2020-06-26
Payer: COMMERCIAL

## 2020-06-26 VITALS
HEIGHT: 72 IN | BODY MASS INDEX: 24.73 KG/M2 | SYSTOLIC BLOOD PRESSURE: 122 MMHG | DIASTOLIC BLOOD PRESSURE: 70 MMHG | WEIGHT: 182.6 LBS

## 2020-06-26 DIAGNOSIS — R73.01 IMPAIRED FASTING GLUCOSE: ICD-10-CM

## 2020-06-26 LAB
A/G RATIO: 1.7 (ref 1.1–2.2)
ALBUMIN SERPL-MCNC: 4.4 G/DL (ref 3.4–5)
ALP BLD-CCNC: 89 U/L (ref 40–129)
ALT SERPL-CCNC: 18 U/L (ref 10–40)
ANION GAP SERPL CALCULATED.3IONS-SCNC: 14 MMOL/L (ref 3–16)
AST SERPL-CCNC: 22 U/L (ref 15–37)
BASOPHILS ABSOLUTE: 0 K/UL (ref 0–0.2)
BASOPHILS RELATIVE PERCENT: 0.7 %
BILIRUB SERPL-MCNC: 0.3 MG/DL (ref 0–1)
BUN BLDV-MCNC: 19 MG/DL (ref 7–20)
CALCIUM SERPL-MCNC: 9 MG/DL (ref 8.3–10.6)
CHLORIDE BLD-SCNC: 102 MMOL/L (ref 99–110)
CHOLESTEROL, TOTAL: 191 MG/DL (ref 0–199)
CO2: 23 MMOL/L (ref 21–32)
CREAT SERPL-MCNC: 0.8 MG/DL (ref 0.8–1.3)
EOSINOPHILS ABSOLUTE: 0.5 K/UL (ref 0–0.6)
EOSINOPHILS RELATIVE PERCENT: 9.1 %
GFR AFRICAN AMERICAN: >60
GFR NON-AFRICAN AMERICAN: >60
GLOBULIN: 2.6 G/DL
GLUCOSE BLD-MCNC: 108 MG/DL (ref 70–99)
HCT VFR BLD CALC: 48.5 % (ref 40.5–52.5)
HDLC SERPL-MCNC: 78 MG/DL (ref 40–60)
HEMOGLOBIN: 16 G/DL (ref 13.5–17.5)
LDL CHOLESTEROL CALCULATED: 99 MG/DL
LYMPHOCYTES ABSOLUTE: 1.4 K/UL (ref 1–5.1)
LYMPHOCYTES RELATIVE PERCENT: 27.6 %
MCH RBC QN AUTO: 31.4 PG (ref 26–34)
MCHC RBC AUTO-ENTMCNC: 33.1 G/DL (ref 31–36)
MCV RBC AUTO: 94.8 FL (ref 80–100)
MONOCYTES ABSOLUTE: 0.5 K/UL (ref 0–1.3)
MONOCYTES RELATIVE PERCENT: 9.4 %
NEUTROPHILS ABSOLUTE: 2.7 K/UL (ref 1.7–7.7)
NEUTROPHILS RELATIVE PERCENT: 53.2 %
PDW BLD-RTO: 13.9 % (ref 12.4–15.4)
PLATELET # BLD: 191 K/UL (ref 135–450)
PMV BLD AUTO: 8.7 FL (ref 5–10.5)
POTASSIUM SERPL-SCNC: 4.5 MMOL/L (ref 3.5–5.1)
RBC # BLD: 5.12 M/UL (ref 4.2–5.9)
SODIUM BLD-SCNC: 139 MMOL/L (ref 136–145)
TOTAL PROTEIN: 7 G/DL (ref 6.4–8.2)
TRIGL SERPL-MCNC: 68 MG/DL (ref 0–150)
VITAMIN D 25-HYDROXY: 54.8 NG/ML
VLDLC SERPL CALC-MCNC: 14 MG/DL
WBC # BLD: 5.1 K/UL (ref 4–11)

## 2020-06-26 PROCEDURE — 99396 PREV VISIT EST AGE 40-64: CPT | Performed by: INTERNAL MEDICINE

## 2020-06-26 SDOH — ECONOMIC STABILITY: FOOD INSECURITY: WITHIN THE PAST 12 MONTHS, YOU WORRIED THAT YOUR FOOD WOULD RUN OUT BEFORE YOU GOT MONEY TO BUY MORE.: NEVER TRUE

## 2020-06-26 SDOH — ECONOMIC STABILITY: INCOME INSECURITY: HOW HARD IS IT FOR YOU TO PAY FOR THE VERY BASICS LIKE FOOD, HOUSING, MEDICAL CARE, AND HEATING?: NOT HARD AT ALL

## 2020-06-26 SDOH — ECONOMIC STABILITY: TRANSPORTATION INSECURITY
IN THE PAST 12 MONTHS, HAS THE LACK OF TRANSPORTATION KEPT YOU FROM MEDICAL APPOINTMENTS OR FROM GETTING MEDICATIONS?: NO

## 2020-06-26 SDOH — ECONOMIC STABILITY: FOOD INSECURITY: WITHIN THE PAST 12 MONTHS, THE FOOD YOU BOUGHT JUST DIDN'T LAST AND YOU DIDN'T HAVE MONEY TO GET MORE.: NEVER TRUE

## 2020-06-26 SDOH — ECONOMIC STABILITY: TRANSPORTATION INSECURITY
IN THE PAST 12 MONTHS, HAS LACK OF TRANSPORTATION KEPT YOU FROM MEETINGS, WORK, OR FROM GETTING THINGS NEEDED FOR DAILY LIVING?: NO

## 2020-06-26 ASSESSMENT — ENCOUNTER SYMPTOMS
GASTROINTESTINAL NEGATIVE: 1
RESPIRATORY NEGATIVE: 1
EYES NEGATIVE: 1

## 2020-06-26 ASSESSMENT — PATIENT HEALTH QUESTIONNAIRE - PHQ9
SUM OF ALL RESPONSES TO PHQ9 QUESTIONS 1 & 2: 0
SUM OF ALL RESPONSES TO PHQ QUESTIONS 1-9: 0
SUM OF ALL RESPONSES TO PHQ QUESTIONS 1-9: 0
1. LITTLE INTEREST OR PLEASURE IN DOING THINGS: 0
2. FEELING DOWN, DEPRESSED OR HOPELESS: 0

## 2020-06-26 NOTE — PROGRESS NOTES
2020    Janki Silva (:  1960) rio 56 y.o. male, here for a preventive medicine evaluation. Patient Active Problem List   Diagnosis    Allergic rhinitis    Anxiety    External hemorrhoid    Sciatica    Degenerative joint disease of right hip    Fam hx-ischem heart disease    Decreased libido    Degenerative joint disease (DJD) of lumbar spine    Degenerative joint disease involving multiple joints    Vitamin D deficiency    Restless leg syndrome    Mild single current episode of major depressive disorder ([address]    Herpes zoster without complication     Here for yearly visit. He has been doing well since hip replacement. Still taking meloxicam for wrist arthritis. Takes amitriptyline intermittently. Still taking Zoloft. Staying active, stand a lot at work and his grandson is playing baseball again so is outside a lot. Review of Systems   Constitutional: Negative. HENT: Negative. Eyes: Negative. Respiratory: Negative. Cardiovascular: Negative. Gastrointestinal: Negative. Genitourinary: Negative. Musculoskeletal: Positive for arthralgias. Skin: Negative. Neurological: Negative. Psychiatric/Behavioral: Negative. Prior to Visit Medications    Medication Sig Taking?  Authorizing Provider   meloxicam (MOBIC) 15 MG tablet TAKE ONE TABLET BY MOUTH DAILY AS NEEDED FOR PAIN Yes Rao Villa MD   amitriptyline (ELAVIL) 10 MG tablet TAKE ONE TABLET BY MOUTH ONCE NIGHTLY Yes Rao Villa MD   sertraline (ZOLOFT) 25 MG tablet TAKE ONE TABLET BY MOUTH DAILY Yes Rao Villa MD   Cholecalciferol (VITAMIN D) 2000 UNITS CAPS capsule Take by mouth PATIENT ONLY TAKES DURING WINTER MONTHS Yes Historical Provider, MD        No Known Allergies    Past Medical History:   Diagnosis Date    Allergic rhinitis     Anxiety     Chicken pox     Degenerative joint disease (DJD) of lumbar spine 2014    Depression     External hemorrhoid        Past Surgical Coordination: Coordination normal.      Deep Tendon Reflexes: Reflexes are normal and symmetric. Psychiatric:         Speech: Speech normal.         Behavior: Behavior normal. Behavior is cooperative. Thought Content: Thought content normal.         Judgment: Judgment normal.         No flowsheet data found. Lab Results   Component Value Date    CHOL 191 06/26/2020    CHOL 172 03/19/2019    CHOL 201 03/07/2018    TRIG 68 06/26/2020    TRIG 51 03/19/2019    TRIG 56 03/07/2018    HDL 78 06/26/2020    HDL 69 03/19/2019    HDL 81 03/07/2018    LDLCALC 99 06/26/2020    LDLCALC 93 03/19/2019    LDLCALC 109 03/07/2018    GLUCOSE 108 06/26/2020       The 10-year ASCVD risk score (Rupa Garcias et al., 2013) is: 6.7%    Values used to calculate the score:      Age: 56 years      Sex: Male      Is Non- : No      Diabetic: No      Tobacco smoker: No      Systolic Blood Pressure: 204 mmHg      Is BP treated: No      HDL Cholesterol: 78 mg/dL      Total Cholesterol: 191 mg/dL    Immunization History   Administered Date(s) Administered    Influenza Virus Vaccine 10/16/2018    Tdap (Boostrix, Adacel) 03/24/2015       Health Maintenance   Topic Date Due    Colon cancer screen colonoscopy  01/14/2019    Shingles Vaccine (1 of 2) 06/26/2021 (Originally 3/12/2008)    Lipid screen  03/19/2024    DTaP/Tdap/Td vaccine (2 - Td) 03/24/2025    Flu vaccine  Completed    Hepatitis C screen  Completed    HIV screen  Completed    Hepatitis A vaccine  Aged Out    Hepatitis B vaccine  Aged Out    Hib vaccine  Aged Out    Meningococcal (ACWY) vaccine  Aged Out    Pneumococcal 0-64 years Vaccine  Aged Out       ASSESSMENT/PLAN:  1. Well adult exam  - Discussed age appropriate preventive care including healthy diet, daily exercise, immunizations and age & gender guided screening tests. - Comprehensive Metabolic Panel; Future  - Lipid Panel; Future  - CBC Auto Differential; Future    2.  Vitamin D

## 2020-06-27 LAB
ESTIMATED AVERAGE GLUCOSE: 114 MG/DL
HBA1C MFR BLD: 5.6 %

## 2020-07-08 ENCOUNTER — OFFICE VISIT (OUTPATIENT)
Dept: DERMATOLOGY | Age: 60
End: 2020-07-08
Payer: COMMERCIAL

## 2020-07-08 PROCEDURE — 17000 DESTRUCT PREMALG LESION: CPT | Performed by: DERMATOLOGY

## 2020-07-08 PROCEDURE — 11102 TANGNTL BX SKIN SINGLE LES: CPT | Performed by: DERMATOLOGY

## 2020-07-08 NOTE — PATIENT INSTRUCTIONS

## 2020-07-08 NOTE — PROGRESS NOTES
[facility]  Dipak Larsen MD  [address] [facility]  1960    56 y.o. male     Date of Visit: 7/8/2020    Chief Complaint: skin lesions    History of Present Illness:    1. He complains of a nonhealing lesion on the right side of forehead that has been present for few months. 2.  He also complains of a persistent scaly lesion on the nose. Review of Systems:  Skin: No new or changing moles. Past Medical History, Family History, Surgical History, Medications and Allergies reviewed. Past Medical History:   Diagnosis Date    Allergic rhinitis     Anxiety     Chicken pox     Degenerative joint disease (DJD) of lumbar spine 12/17/2014    Depression     External hemorrhoid      Past Surgical History:   Procedure Laterality Date    APPENDECTOMY      COLONOSCOPY  1.2014    HERNIA REPAIR      bialt    [place] ARTHROCENTESIS ASPIR&/INJ MAJOR JT/BURSA W/O US Bilateral 10/19/2018    BILATERAL HIP INJECTION WITH FLUOROSCOPY performed by Kenneth Rebolledo MD at [address] Left     cyst and labrum tear repair    VASECTOMY         No Known Allergies  Outpatient Medications Marked as Taking for the 7/8/20 encounter (Office Visit) with Callie Tong MD   Medication Sig Dispense Refill    meloxicam (MOBIC) 15 MG tablet TAKE ONE TABLET BY MOUTH DAILY AS NEEDED FOR PAIN 30 tablet 3    amitriptyline (ELAVIL) 10 MG tablet TAKE ONE TABLET BY MOUTH ONCE NIGHTLY 30 tablet 5    sertraline (ZOLOFT) 25 MG tablet TAKE ONE TABLET BY MOUTH DAILY 30 tablet 5    Cholecalciferol (VITAMIN D) 2000 UNITS CAPS capsule Take by mouth PATIENT ONLY TAKES DURING WINTER MONTHS         Physical Examination       The following were examined and determined to be normal: Psych/Neuro, Scalp/hair, Conjunctivae/eyelids, Gums/teeth/lips, Neck, Breast/axilla/chest, Abdomen, Back, RUE, LUE, RLE, LLE and Nails/digits.     The following were examined and determined to be abnormal: Head/face. Well appearing. 1.  Right side of the forehead with a 0.5 x 1 cm oval-shaped telangiectatic pearly patch. 2.  Left nasal tip with 1 keratotic erythematous macule. Assessment and Plan     1. Neoplasm of uncertain behavior of skin, right forehead - r/o BCC    Discussed possible diagnosis; patient agreeable to biopsy (verbal consent obtained). The area(s) to be biopsied were marked with a surgical pen. Alcohol was used to cleanse the site. Local anesthesia was acheived with 1% lidocaine with epinephrine. Shave biopsy was performed using a razor blade. Hemostasis was achieved with aluminum chloride. The wound(s) were dressed with petrolatum and covered with a bandage. Wound care instructions were reviewed. 1 Specimen (s) sent to pathology. The specimen bottles were appropriately labeled. We also reviewed the risks of bleeding, scar, and infection. 2. Actinic keratosis - 1    2 cycles of liquid nitrogen applied to 1 AK on the left nasal tip. Patient was educated regarding the potential risks of blister formation, discomfort, hypopigmentation, and scar. Wound care was discussed. Return in about 6 months (around 1/8/2021).

## 2020-07-10 LAB — DERMATOLOGY PATHOLOGY REPORT: ABNORMAL

## 2020-07-13 ENCOUNTER — TELEPHONE (OUTPATIENT)
Dept: DERMATOLOGY | Age: 60
End: 2020-07-13

## 2020-07-13 NOTE — TELEPHONE ENCOUNTER
Informed patient of biopsy results. Will place referral for Moh's surgery to Dr. Meera Willard. Patient verbalized understanding.

## 2020-08-06 ENCOUNTER — PROCEDURE VISIT (OUTPATIENT)
Dept: SURGERY | Age: 60
End: 2020-08-06
Payer: COMMERCIAL

## 2020-08-06 ENCOUNTER — TELEPHONE (OUTPATIENT)
Dept: SURGERY | Age: 60
End: 2020-08-06

## 2020-08-06 VITALS — TEMPERATURE: 97 F

## 2020-08-06 PROBLEM — C44.319 BASAL CELL CARCINOMA OF FOREHEAD: Status: ACTIVE | Noted: 2020-08-06

## 2020-08-06 PROCEDURE — 17312 MOHS ADDL STAGE: CPT | Performed by: DERMATOLOGY

## 2020-08-06 PROCEDURE — 17311 MOHS 1 STAGE H/N/HF/G: CPT | Performed by: DERMATOLOGY

## 2020-08-06 PROCEDURE — 12052 INTMD RPR FACE/MM 2.6-5.0 CM: CPT | Performed by: DERMATOLOGY

## 2020-08-06 NOTE — PATIENT INSTRUCTIONS
Strength Tylenol). Follow the instructions and warning on the bottle. -If your doctor has prescribed you an Aspirin daily, please keep taking it. Do not take extra Aspirin or medicines containing Aspirin (such as Mallika-Glenwood and Excedrin) for 48 hours after your procedure. Bleeding: If bleeding occurs, DO NOT remove the bandage. Put firm pressure on the area with gauze for 20 minutes without peeking. If the bleeding continues, apply pressure for another 20 minutes. If the bleeding does not stop after you apply pressure, call us right away. If you can not call, go to the nearest emergency room or urgent care facility. What to expect:  You may have these symptoms. They are normal and should get better with time:  1. Swelling. Swelling usually increases for the first 48 hours after your procedure and then begins to improve. Some soreness and redness around your wound. If we worked close to your eyes (forehead, nose, temple, or upper cheeks) your eyes may become swollen and/ or black and blue. 2. Bruising, which could last 1 week or more. 3. Pink and bumpy appearance to the scar. This may happen a few weeks after your procedure. After 4 weeks, you may gently massage the area each day with facial moisturizer or petroleum jelly (Vaseline or Aquaphor). This will help to smooth the skin and improve the appearance of the scar. The color of your scar will fade over time, but it may be pink for several months after the procedure. The scar may take 6 months to 1 year to reach its final color and appearance. 4. \"Spitting\" suture. Occasionally, an inside suture (stitch) does not completely dissolve. When this happens, (generally 4-8 weeks after surgery), it causes a bump or \"pimple\" to form on the scar. This is easily removed and is not at all serious. It does not mean the skin cancer has returned. Contact us if it happens, but do not be alarmed. Vitamin E oil is NOT necessary.  A good moisturizer is just as effective. Sunscreen IS necessary. Use at least and SPF 30 sunscreen daily- even in winter    Call us at [phone] right away if you have any of the following symptoms:  -Bleeding that you can not stop (see highlighted area above). -Pain that lasts longer than 48 hours.  -Your wound becomes more painful, red or hot. -Bruising and swelling that does not begin to improve within the 48 hours or gets worse suddenly.

## 2020-08-07 ENCOUNTER — TELEPHONE (OUTPATIENT)
Dept: SURGERY | Age: 60
End: 2020-08-07

## 2020-08-07 NOTE — TELEPHONE ENCOUNTER
The patient was in the office on 8/6/20 for mohs located on the R forehead with ILC repair. The patient tolerated the procedure well and left the office in good condition. A post-operative telephone call was placed at 10:05AM in order to check on the patient's recovery process. The patient was not able to be reached and a phone message was left.

## 2020-09-11 ENCOUNTER — HOSPITAL ENCOUNTER (OUTPATIENT)
Dept: CARDIAC REHAB | Age: 60
Setting detail: THERAPIES SERIES
Discharge: HOME | End: 2020-09-11
Payer: COMMERCIAL

## 2020-09-11 PROCEDURE — 93798 PHYS/QHP OP CAR RHAB W/ECG: CPT

## 2020-09-11 RX ORDER — ASPIRIN 81 MG/1
81 TABLET ORAL DAILY
COMMUNITY

## 2020-09-11 RX ORDER — LOSARTAN POTASSIUM 25 MG/1
25 TABLET ORAL DAILY
COMMUNITY

## 2020-09-11 RX ORDER — ATORVASTATIN CALCIUM 80 MG/1
80 TABLET, FILM COATED ORAL DAILY
COMMUNITY

## 2020-09-11 RX ORDER — CARVEDILOL 6.25 MG/1
6.25 TABLET ORAL 2 TIMES DAILY WITH MEALS
COMMUNITY
End: 2022-01-13

## 2020-09-14 ENCOUNTER — HOSPITAL ENCOUNTER (OUTPATIENT)
Dept: CARDIAC REHAB | Age: 60
Setting detail: THERAPIES SERIES
Discharge: HOME | End: 2020-09-14
Payer: COMMERCIAL

## 2020-09-14 PROCEDURE — 93798 PHYS/QHP OP CAR RHAB W/ECG: CPT

## 2021-01-13 ENCOUNTER — OFFICE VISIT (OUTPATIENT)
Dept: DERMATOLOGY | Age: 61
End: 2021-01-13
Payer: COMMERCIAL

## 2021-01-13 VITALS — TEMPERATURE: 97.2 F

## 2021-01-13 DIAGNOSIS — L57.0 ACTINIC KERATOSIS: Primary | ICD-10-CM

## 2021-01-13 DIAGNOSIS — L30.9 DERMATITIS: ICD-10-CM

## 2021-01-13 DIAGNOSIS — Z85.828 HISTORY OF BASAL CELL CARCINOMA: ICD-10-CM

## 2021-01-13 PROCEDURE — 99213 OFFICE O/P EST LOW 20 MIN: CPT | Performed by: DERMATOLOGY

## 2021-01-13 RX ORDER — TRIAMCINOLONE ACETONIDE 1 MG/G
CREAM TOPICAL
Qty: 45 G | Refills: 2 | Status: SHIPPED | OUTPATIENT
Start: 2021-01-13

## 2021-01-13 NOTE — PROGRESS NOTES
[facility]  Zina Sosa MD  [address] [facility]  1960    56 y.o. male     Date of Visit: 1/13/2021    Chief Complaint: skin lesions, rash    History of Present Illness:    1. Follow-up for history of actinic keratoses-has few asymptomatic lesions on the face today. None are bothersome. 2. He complains of a 1 month history of a pruritic eruption on the neck. Did not improve with cortisone cream.      3.  He has a recent history of an infiltrating BCC of the right forehead-treated with Mohs by Dr. Cornel Bartlett on 8/6/2020. He denies any signs of recurrence. Review of Systems:  Skin: No new or changing moles. Past Medical History, Family History, Surgical History, Medications and Allergies reviewed. Past Medical History:   Diagnosis Date    Allergic rhinitis     Anxiety     Chicken pox     Degenerative joint disease (DJD) of lumbar spine 12/17/2014    Depression     External hemorrhoid      Past Surgical History:   Procedure Laterality Date    APPENDECTOMY      COLONOSCOPY  1.2014    HERNIA REPAIR      bialt    MOHS SURGERY  08/06/2020    Right forehead\    [place] ARTHROCENTESIS ASPIR&/INJ MAJOR JT/BURSA W/O US Bilateral 10/19/2018    BILATERAL HIP INJECTION WITH FLUOROSCOPY performed by Anisa Smith MD at [address] Left     cyst and labrum tear repair    VASECTOMY         No Known Allergies  Outpatient Medications Marked as Taking for the 1/13/21 encounter (Office Visit) with Meghna Lynn MD   Medication Sig Dispense Refill    triamcinolone (KENALOG) 0.1 % cream Apply to affected areas of the skin on the neck twice daily for up to 2 weeks or until improved.  45 g 2    sertraline (ZOLOFT) 25 MG tablet TAKE ONE TABLET BY MOUTH DAILY 30 tablet 5    aspirin 81 MG EC tablet Take 81 mg by mouth daily      carvedilol (COREG) 6.25 MG tablet Take 6.25 mg by mouth 2 times daily (with meals)      losartan (COZAAR) 25 MG tablet Take

## 2021-03-12 ENCOUNTER — IMMUNIZATION (OUTPATIENT)
Dept: PRIMARY CARE CLINIC | Age: 61
End: 2021-03-12
Payer: COMMERCIAL

## 2021-03-12 PROCEDURE — 91301 COVID-19, MODERNA VACCINE 100MCG/0.5ML DOSE: CPT | Performed by: FAMILY MEDICINE

## 2021-03-12 PROCEDURE — 0011A COVID-19, MODERNA VACCINE 100MCG/0.5ML DOSE: CPT | Performed by: FAMILY MEDICINE

## 2021-04-04 DIAGNOSIS — F32.0 MILD SINGLE CURRENT EPISODE OF MAJOR DEPRESSIVE DISORDER (HCC): ICD-10-CM

## 2021-04-06 RX ORDER — SERTRALINE HYDROCHLORIDE 25 MG/1
TABLET, FILM COATED ORAL
Qty: 30 TABLET | Refills: 4 | Status: SHIPPED | OUTPATIENT
Start: 2021-04-06 | End: 2021-09-10

## 2021-04-09 ENCOUNTER — IMMUNIZATION (OUTPATIENT)
Dept: PRIMARY CARE CLINIC | Age: 61
End: 2021-04-09
Payer: COMMERCIAL

## 2021-04-09 PROCEDURE — 0012A COVID-19, MODERNA VACCINE 100MCG/0.5ML DOSE: CPT | Performed by: FAMILY MEDICINE

## 2021-04-09 PROCEDURE — 91301 COVID-19, MODERNA VACCINE 100MCG/0.5ML DOSE: CPT | Performed by: FAMILY MEDICINE

## 2021-05-24 ENCOUNTER — OFFICE VISIT (OUTPATIENT)
Dept: INTERNAL MEDICINE CLINIC | Age: 61
End: 2021-05-24
Payer: COMMERCIAL

## 2021-05-24 VITALS
WEIGHT: 188.4 LBS | DIASTOLIC BLOOD PRESSURE: 72 MMHG | OXYGEN SATURATION: 97 % | TEMPERATURE: 97.4 F | BODY MASS INDEX: 25.52 KG/M2 | SYSTOLIC BLOOD PRESSURE: 122 MMHG | HEART RATE: 83 BPM | HEIGHT: 72 IN

## 2021-05-24 DIAGNOSIS — Z12.5 SCREENING FOR MALIGNANT NEOPLASM OF PROSTATE: ICD-10-CM

## 2021-05-24 DIAGNOSIS — Z00.00 WELL ADULT EXAM: Primary | ICD-10-CM

## 2021-05-24 DIAGNOSIS — I25.10 CORONARY ARTERY DISEASE INVOLVING NATIVE CORONARY ARTERY OF NATIVE HEART WITHOUT ANGINA PECTORIS: ICD-10-CM

## 2021-05-24 DIAGNOSIS — F32.0 MILD SINGLE CURRENT EPISODE OF MAJOR DEPRESSIVE DISORDER (HCC): ICD-10-CM

## 2021-05-24 DIAGNOSIS — M15.9 PRIMARY OSTEOARTHRITIS INVOLVING MULTIPLE JOINTS: ICD-10-CM

## 2021-05-24 PROBLEM — I10 ESSENTIAL HYPERTENSION: Status: RESOLVED | Noted: 2020-10-02 | Resolved: 2021-05-24

## 2021-05-24 PROBLEM — I10 ESSENTIAL HYPERTENSION: Status: ACTIVE | Noted: 2020-10-02

## 2021-05-24 LAB — PROSTATE SPECIFIC ANTIGEN: 0.31 NG/ML (ref 0–4)

## 2021-05-24 PROCEDURE — 99396 PREV VISIT EST AGE 40-64: CPT | Performed by: INTERNAL MEDICINE

## 2021-05-24 ASSESSMENT — ENCOUNTER SYMPTOMS
ABDOMINAL PAIN: 0
CONSTIPATION: 0
COUGH: 0
DIARRHEA: 0
SHORTNESS OF BREATH: 0

## 2021-05-24 ASSESSMENT — PATIENT HEALTH QUESTIONNAIRE - PHQ9
1. LITTLE INTEREST OR PLEASURE IN DOING THINGS: 0
SUM OF ALL RESPONSES TO PHQ QUESTIONS 1-9: 0

## 2021-05-24 NOTE — PROGRESS NOTES
2021    Shyam Gentleman (:  1960) rio 59 y.o. male, here for a preventive medicine evaluation. Patient Active Problem List   Diagnosis    Allergic rhinitis    Anxiety    External hemorrhoid    Sciatica    Degenerative joint disease of right hip    Fam hx-ischem heart disease    Decreased libido    Degenerative joint disease (DJD) of lumbar spine    Degenerative joint disease involving multiple joints    Vitamin D deficiency    Restless leg syndrome    Mild single current episode of major depressive disorder ([facility] [address]    Herpes zoster without complication    Basal cell carcinoma of forehead    Coronary artery disease involving native coronary artery of native heart without angina pectoris     Here for physical.    He had a heart attack in the last year, he has been doing well since. Has some fatigue and a little bit of weight gain, but just as active as before and diet is even better. He has no chest pain or shortness of breath with exertion. He saw a rheumatologist for mildly positive SALOMON and possible inflammatory arthritis of the wrists, inconclusive at this point as to whether it is inflammatory arthritis. Right now he is using topical diclofenac since oral NSAIDs are contraindicated with known CAD and history of MI. Colonoscopy - Dr. Magdalena Garner in 2019    Review of Systems   Constitutional: Positive for fatigue. Negative for activity change, appetite change and unexpected weight change. HENT: Negative for ear pain and hearing loss. Eyes: Negative for visual disturbance. Respiratory: Negative for cough and shortness of breath. Cardiovascular: Negative for chest pain and leg swelling. Gastrointestinal: Negative for abdominal pain, constipation and diarrhea. Genitourinary: Negative for difficulty urinating. Musculoskeletal: Positive for arthralgias. Skin: Negative for rash. Neurological: Negative for weakness and light-headedness.    Psychiatric/Behavioral: Negative for dysphoric mood and sleep disturbance. Prior to Visit Medications    Medication Sig Taking? Authorizing Provider   sertraline (ZOLOFT) 25 MG tablet TAKE ONE TABLET BY MOUTH DAILY Yes Lelia Mendoza MD   amitriptyline (ELAVIL) 10 MG tablet TAKE ONE TABLET BY MOUTH ONCE NIGHTLY Yes Lelia Mendoza MD   triamcinolone (KENALOG) 0.1 % cream Apply to affected areas of the skin on the neck twice daily for up to 2 weeks or until improved.  Yes David Rivera MD   aspirin 81 MG EC tablet Take 81 mg by mouth daily Yes Historical Provider, MD   carvedilol (COREG) 6.25 MG tablet Take 6.25 mg by mouth 2 times daily (with meals) Yes Historical Provider, MD   losartan (COZAAR) 25 MG tablet Take 25 mg by mouth daily Yes Historical Provider, MD   ticagrelor (BRILINTA) 90 MG TABS tablet Take 90 mg by mouth 2 times daily Yes Historical Provider, MD   atorvastatin (LIPITOR) 80 MG tablet Take 80 mg by mouth daily Yes Historical Provider, MD   Cholecalciferol (VITAMIN D) 2000 UNITS CAPS capsule Take by mouth PATIENT ONLY TAKES DURING WINTER MONTHS Yes Historical Provider, MD        No Known Allergies    Past Medical History:   Diagnosis Date    Allergic rhinitis     Anxiety     Chicken pox     Degenerative joint disease (DJD) of lumbar spine 12/17/2014    Depression     External hemorrhoid        Past Surgical History:   Procedure Laterality Date    APPENDECTOMY      COLONOSCOPY  1.2014    HERNIA REPAIR      bialt    MOHS SURGERY  08/06/2020    Right forehead\    [place] ARTHROCENTESIS ASPIR&/INJ MAJOR JT/BURSA W/O US Bilateral 10/19/2018    BILATERAL HIP INJECTION WITH FLUOROSCOPY performed by Parag Gutierrez MD at [address] [facility] Left     cyst and labrum tear repair    VASECTOMY         Social History     Socioeconomic History    Marital status:      Spouse name: Not on file    Number of children: Not on file    Years of education: Not on file    Highest education level: Not on file Occupational History    Not on file   Tobacco Use    Smoking status: Never Smoker    Smokeless tobacco: Never Used   Substance and Sexual Activity    Alcohol use: Yes     Comment: weekly    Drug use: No    Sexual activity: Yes   Other Topics Concern    Not on file   Social History Narrative    Not on file     Social Determinants of Health     Financial Resource Strain: Low Risk     Difficulty of Paying Living Expenses: Not hard at all   Food Insecurity: No Food Insecurity    Worried About Running Out of Food in the Last Year: Never true    Sander of Food in the Last Year: Never true   Transportation Needs: No Transportation Needs    Lack of Transportation (Medical): No    Lack of Transportation (Non-Medical): No   Physical Activity:     Days of Exercise per Week:     Minutes of Exercise per Session:    Stress:     Feeling of Stress :    Social Connections:     Frequency of Communication with Friends and Family:     Frequency of Social Gatherings with Friends and Family:     Attends Congregation Services:     Active Member of Clubs or Organizations:     Attends Club or Organization Meetings:     Marital Status:    Intimate Partner Violence:     Fear of Current or Ex-Partner:     Emotionally Abused:     Physically Abused:     Sexually Abused:         Family History   Problem Relation Age of Onset    High Blood Pressure Mother     Cancer Mother         colon    Cancer Father         lung, kidney    High Blood Pressure Sister     Heart Disease Brother     Early Death Brother     High Blood Pressure Brother     High Blood Pressure Brother        ADVANCEDIRECTIVE: N, <no information>    Vitals:    05/24/21 0907   BP: 122/72   Site: Right Upper Arm   Pulse: 83   Temp: 97.4 °F (36.3 °C)   SpO2: 97%   Weight: 188 lb 6.4 oz (85.5 kg)   Height: 6' (1.829 m)     Estimated body mass index is 25.55 kg/m² as calculated from the following:    Height as of this encounter: 6' (1.829 m).     Weight as of this encounter: 188 lb 6.4 oz (85.5 kg). Physical Exam  Vitals reviewed. Constitutional:       General: He is not in acute distress. Appearance: Normal appearance. He is well-developed. HENT:      Head: Normocephalic and atraumatic. Right Ear: External ear normal. There is impacted cerumen. Left Ear: External ear normal. There is impacted cerumen. Eyes:      General: No scleral icterus. Conjunctiva/sclera: Conjunctivae normal.   Neck:      Thyroid: No thyroid mass, thyromegaly or thyroid tenderness. Vascular: No carotid bruit. Cardiovascular:      Rate and Rhythm: Normal rate and regular rhythm. Heart sounds: Normal heart sounds. Pulmonary:      Effort: Pulmonary effort is normal. No respiratory distress. Breath sounds: Normal breath sounds. Abdominal:      General: Abdomen is flat. There is no distension. Palpations: Abdomen is soft. There is no mass. Tenderness: There is no abdominal tenderness. Hernia: No hernia is present. Musculoskeletal:      Cervical back: Neck supple. Right lower leg: No edema. Left lower leg: No edema. Lymphadenopathy:      Cervical: No cervical adenopathy. Skin:     General: Skin is warm and dry. Neurological:      General: No focal deficit present. Mental Status: He is alert and oriented to person, place, and time. Gait: Gait normal.   Psychiatric:         Mood and Affect: Mood normal.         Behavior: Behavior normal.         Thought Content: Thought content normal.         Judgment: Judgment normal.         No flowsheet data found.     Lab Results   Component Value Date    CHOL 191 06/26/2020    CHOL 172 03/19/2019    CHOL 201 03/07/2018    TRIG 68 06/26/2020    TRIG 51 03/19/2019    TRIG 56 03/07/2018    HDL 78 06/26/2020    HDL 69 03/19/2019    HDL 81 03/07/2018    LDLCALC 99 06/26/2020    LDLCALC 93 03/19/2019    LDLCALC 109 03/07/2018    GLUCOSE 108 06/26/2020    LABA1C 5.6 06/26/2020 The 10-year ASCVD risk score (Vesna Boyce., et al., 2013) is: 7.4%    Values used to calculate the score:      Age: 59 years      Sex: Male      Is Non- : No      Diabetic: No      Tobacco smoker: No      Systolic Blood Pressure: 834 mmHg      Is BP treated: No      HDL Cholesterol: 78 mg/dL      Total Cholesterol: 191 mg/dL    Immunization History   Administered Date(s) Administered    COVID-19, Moderna, PF, 100mcg/0.5mL 03/12/2021, 04/09/2021    Influenza Virus Vaccine 10/16/2018    Tdap (Boostrix, Adacel) 03/24/2015       Health Maintenance   Topic Date Due    Colon cancer screen colonoscopy  01/14/2019    Shingles Vaccine (1 of 2) 06/26/2021 (Originally 3/12/2008)    Lipid screen  06/26/2021    Potassium monitoring  06/26/2021    Creatinine monitoring  06/26/2021    Diabetes screen  06/26/2023    DTaP/Tdap/Td vaccine (2 - Td) 03/24/2025    Flu vaccine  Completed    COVID-19 Vaccine  Completed    Hepatitis C screen  Completed    HIV screen  Completed    Hepatitis A vaccine  Aged Out    Hepatitis B vaccine  Aged Out    Hib vaccine  Aged Out    Meningococcal (ACWY) vaccine  Aged Out    Pneumococcal 0-64 years Vaccine  Aged Out       ASSESSMENT/PLAN:  1. Well adult exam  - Discussed age appropriate preventive care including healthy diet, daily exercise, immunizations and age & gender guided screening tests. 2. Screening for malignant neoplasm of prostate  - PSA screening; Future    3. Mild single current episode of major depressive disorder (HCC)  - stable, continue sertraline 25mg daily, amitriptyline 10mg nightly    4. Coronary artery disease involving native coronary artery of native heart without angina pectoris  - s/p PCI for STEMI, seeing cardiologist, oding well on ASA 81mg daily, atorvastatin 80mg daily, carvedilol 6.25mg twice daily, losratan 25mg daily, ticagrelor 90mg twice daily    5.  Primary osteoarthritis involving multiple joints  - osteoarthritis with

## 2022-01-13 ENCOUNTER — OFFICE VISIT (OUTPATIENT)
Dept: DERMATOLOGY | Age: 62
End: 2022-01-13
Payer: COMMERCIAL

## 2022-01-13 VITALS — TEMPERATURE: 97.2 F

## 2022-01-13 DIAGNOSIS — L57.0 ACTINIC KERATOSIS: Primary | ICD-10-CM

## 2022-01-13 DIAGNOSIS — L81.4 SOLAR LENTIGO: ICD-10-CM

## 2022-01-13 PROCEDURE — 17003 DESTRUCT PREMALG LES 2-14: CPT | Performed by: DERMATOLOGY

## 2022-01-13 PROCEDURE — 99212 OFFICE O/P EST SF 10 MIN: CPT | Performed by: DERMATOLOGY

## 2022-01-13 PROCEDURE — 17000 DESTRUCT PREMALG LESION: CPT | Performed by: DERMATOLOGY

## 2022-01-13 RX ORDER — METOPROLOL SUCCINATE 25 MG/1
25 TABLET, EXTENDED RELEASE ORAL NIGHTLY
COMMUNITY
Start: 2021-08-03

## 2022-01-13 RX ORDER — HYDROXYCHLOROQUINE SULFATE 200 MG/1
200 TABLET, FILM COATED ORAL 2 TIMES DAILY
COMMUNITY
Start: 2022-01-07

## 2022-01-13 RX ORDER — CLOPIDOGREL BISULFATE 75 MG/1
75 TABLET ORAL DAILY
COMMUNITY
Start: 2021-10-20

## 2022-01-13 NOTE — PROGRESS NOTES
[facility]  Laurel Jewell MD  [address] [facility]  1960    59 y.o. male     Date of Visit: 1/13/2022    Chief Complaint: skin lesions    History of Present Illness:    1. He reports a couple of persistent scaly lesions on the forehead and nose. 2.  He has stable pigmented lesions on the sides of the face. He has a history of an infiltrating BCC of the right foreheadtreated with Mohs by Dr. Andrei Robison on 8/6/2020. On Plaquenil for erosive osteoarthritis. Review of Systems:  Gen: Feels well, good sense of health. Past Medical History, Family History, Surgical History, Medications and Allergies reviewed.     Past Medical History:   Diagnosis Date    Allergic rhinitis     Anxiety     Chicken pox     Degenerative joint disease (DJD) of lumbar spine 12/17/2014    Depression     External hemorrhoid      Past Surgical History:   Procedure Laterality Date    APPENDECTOMY      COLONOSCOPY  1.2014    HERNIA REPAIR      bialt    MOHS SURGERY  08/06/2020    Right forehead\    [place] ARTHROCENTESIS ASPIR&/INJ MAJOR JT/BURSA W/O US Bilateral 10/19/2018    BILATERAL HIP INJECTION WITH FLUOROSCOPY performed by Ross Velasquez MD at [address] [facility] [address] Left     cyst and labrum tear repair    VASECTOMY         No Known Allergies  Outpatient Medications Marked as Taking for the 1/13/22 encounter (Office Visit) with Carolina Chao MD   Medication Sig Dispense Refill    clopidogrel (PLAVIX) 75 MG tablet Take 75 mg by mouth daily      hydroxychloroquine (PLAQUENIL) 200 MG tablet Take 200 mg by mouth 2 times daily      metoprolol succinate (TOPROL XL) 25 MG extended release tablet Take 25 mg by mouth nightly      sertraline (ZOLOFT) 25 MG tablet TAKE ONE TABLET BY MOUTH DAILY 90 tablet 1    amitriptyline (ELAVIL) 10 MG tablet TAKE ONE TABLET BY MOUTH ONCE NIGHTLY 30 tablet 5    triamcinolone (KENALOG) 0.1 % cream Apply to affected areas of the skin on the neck twice daily for up to 2 weeks or until improved. 45 g 2    aspirin 81 MG EC tablet Take 81 mg by mouth daily      losartan (COZAAR) 25 MG tablet Take 25 mg by mouth daily      atorvastatin (LIPITOR) 80 MG tablet Take 80 mg by mouth daily      Cholecalciferol (VITAMIN D) 2000 UNITS CAPS capsule Take by mouth PATIENT ONLY TAKES DURING WINTER MONTHS       7 yo grandson lives with he and his wife. Physical Examination       The following were examined and determined to be normal: Psych/Neuro, Scalp/hair, Conjunctivae/eyelids, Gums/teeth/lips, Neck, Breast/axilla/chest, Abdomen, Back, RUE, LUE, RLE, LLE and Nails/digits. The following were examined and determined to be abnormal: Head/face. Well appearing. 1.  Left forehead - 1, right proximal nasal dorsum - 1, left lower cheek - 1: 3 round scaly pink tan macules. 2.  Lateral portions of the face with round smooth light brown macules. Assessment and Plan     1. Actinic keratosis - few    2 cycles of liquid nitrogen applied to 3 AKs: Left forehead - 1, right proximal nasal dorsum - 1, left lower cheek - 1. Patient was educated regarding the potential risks of blister formation and discomfort. Wound care was discussed. 2. Solar lentigines    Monitor for change. Continue sun protective behaviors with at least SPF 30 sunscreen. Return in about 1 year (around 1/13/2023).     --Mi Horner MD

## 2022-03-28 RX ORDER — AMITRIPTYLINE HYDROCHLORIDE 10 MG/1
TABLET, FILM COATED ORAL
Qty: 30 TABLET | Refills: 2 | Status: SHIPPED | OUTPATIENT
Start: 2022-03-28

## 2022-04-04 DIAGNOSIS — F32.0 MILD SINGLE CURRENT EPISODE OF MAJOR DEPRESSIVE DISORDER (HCC): ICD-10-CM

## 2022-04-06 RX ORDER — SERTRALINE HYDROCHLORIDE 25 MG/1
TABLET, FILM COATED ORAL
Qty: 30 TABLET | Refills: 0 | Status: SHIPPED | OUTPATIENT
Start: 2022-04-06 | End: 2022-05-06

## 2022-04-15 ENCOUNTER — TELEPHONE (OUTPATIENT)
Dept: INTERNAL MEDICINE CLINIC | Age: 62
End: 2022-04-15

## 2022-04-15 NOTE — TELEPHONE ENCOUNTER
----- Message from Ro Posey sent at 4/15/2022  4:27 PM EDT -----  Subject: Appointment Request    Reason for Call: Routine Pre-Op    QUESTIONS  Type of Appointment? Established Patient  Reason for appointment request? No appointments available during search  Additional Information for Provider? Please call back no available   appointments, surgery is May 4th, left wrist.   ---------------------------------------------------------------------------  --------------  CALL BACK INFO  What is the best way for the office to contact you? OK to leave message on   voicemail  Preferred Call Back Phone Number? [phone]  ---------------------------------------------------------------------------  --------------  SCRIPT ANSWERS  Relationship to Patient? Self  Do you have questions for your provider that need to be answered prior to   scheduling your pre-op appointment? No  Have you been diagnosed with, awaiting test results for, or told that you   are suspected of having COVID-19 (Coronavirus)? (If patient has tested   negative or was tested as a requirement for work, school, or travel and   not based on symptoms, answer no)? No  Within the past 10 days have you developed any of the following symptoms   (answer no if symptoms have been present longer than 10 days or began   more than 10 days ago)? Fever or Chills, Cough, Shortness of breath or   difficulty breathing, Loss of taste or smell, Sore throat, Nasal   congestion, Sneezing or runny nose, Fatigue or generalized body aches   (answer no if pain is specific to a body part e.g. back pain), Diarrhea,   Headache? No  Have you had close contact with someone with COVID-19 in the last 7 days? No  (Service Expert  click yes below to proceed with [employer] As Usual   Scheduling)?  Yes

## 2022-04-21 ENCOUNTER — OFFICE VISIT (OUTPATIENT)
Dept: INTERNAL MEDICINE CLINIC | Age: 62
End: 2022-04-21
Payer: COMMERCIAL

## 2022-04-21 VITALS
TEMPERATURE: 97.1 F | WEIGHT: 180.6 LBS | DIASTOLIC BLOOD PRESSURE: 82 MMHG | HEIGHT: 72 IN | OXYGEN SATURATION: 94 % | HEART RATE: 62 BPM | SYSTOLIC BLOOD PRESSURE: 102 MMHG | BODY MASS INDEX: 24.46 KG/M2

## 2022-04-21 DIAGNOSIS — I25.10 CORONARY ARTERY DISEASE INVOLVING NATIVE CORONARY ARTERY OF NATIVE HEART WITHOUT ANGINA PECTORIS: ICD-10-CM

## 2022-04-21 DIAGNOSIS — M19.032 ARTHRITIS OF LEFT WRIST: ICD-10-CM

## 2022-04-21 DIAGNOSIS — Z01.818 PRE-OP EXAM: Primary | ICD-10-CM

## 2022-04-21 PROCEDURE — 99214 OFFICE O/P EST MOD 30 MIN: CPT | Performed by: INTERNAL MEDICINE

## 2022-04-21 SDOH — ECONOMIC STABILITY: FOOD INSECURITY: WITHIN THE PAST 12 MONTHS, YOU WORRIED THAT YOUR FOOD WOULD RUN OUT BEFORE YOU GOT MONEY TO BUY MORE.: NEVER TRUE

## 2022-04-21 SDOH — ECONOMIC STABILITY: FOOD INSECURITY: WITHIN THE PAST 12 MONTHS, THE FOOD YOU BOUGHT JUST DIDN'T LAST AND YOU DIDN'T HAVE MONEY TO GET MORE.: NEVER TRUE

## 2022-04-21 ASSESSMENT — PATIENT HEALTH QUESTIONNAIRE - PHQ9
4. FEELING TIRED OR HAVING LITTLE ENERGY: 0
8. MOVING OR SPEAKING SO SLOWLY THAT OTHER PEOPLE COULD HAVE NOTICED. OR THE OPPOSITE, BEING SO FIGETY OR RESTLESS THAT YOU HAVE BEEN MOVING AROUND A LOT MORE THAN USUAL: 0
9. THOUGHTS THAT YOU WOULD BE BETTER OFF DEAD, OR OF HURTING YOURSELF: 0
3. TROUBLE FALLING OR STAYING ASLEEP: 0
SUM OF ALL RESPONSES TO PHQ QUESTIONS 1-9: 0
6. FEELING BAD ABOUT YOURSELF - OR THAT YOU ARE A FAILURE OR HAVE LET YOURSELF OR YOUR FAMILY DOWN: 0
1. LITTLE INTEREST OR PLEASURE IN DOING THINGS: 0
SUM OF ALL RESPONSES TO PHQ QUESTIONS 1-9: 0
10. IF YOU CHECKED OFF ANY PROBLEMS, HOW DIFFICULT HAVE THESE PROBLEMS MADE IT FOR YOU TO DO YOUR WORK, TAKE CARE OF THINGS AT HOME, OR GET ALONG WITH OTHER PEOPLE: 0
7. TROUBLE CONCENTRATING ON THINGS, SUCH AS READING THE NEWSPAPER OR WATCHING TELEVISION: 0
5. POOR APPETITE OR OVEREATING: 0
2. FEELING DOWN, DEPRESSED OR HOPELESS: 0
SUM OF ALL RESPONSES TO PHQ9 QUESTIONS 1 & 2: 0

## 2022-04-21 ASSESSMENT — SOCIAL DETERMINANTS OF HEALTH (SDOH): HOW HARD IS IT FOR YOU TO PAY FOR THE VERY BASICS LIKE FOOD, HOUSING, MEDICAL CARE, AND HEATING?: NOT HARD AT ALL

## 2022-04-21 NOTE — PROGRESS NOTES
Chief Complaint   Patient presents with    Pre-op Exam     Left wrist fusion; Dr. Clyde Sosa; 5/4/22 @[address]      Fax [phone] Angelina Duran is a 57 y.o. male who presents for a preoperative physical examination. He is scheduled to have left wrist fusion done by Dr. Clyde Sosa at [address] [facility] on 5/4/22. History of Present Illness:      Clifford Staggchrissy has left wrist arthritis and left wrist pain and stiffness. Past Medical History:   Diagnosis Date    Allergic rhinitis     Anxiety     Chicken pox     Degenerative joint disease (DJD) of lumbar spine 12/17/2014    Depression     External hemorrhoid         Review of patient's past surgical history indicates:     Past Surgical History:   Procedure Laterality Date    APPENDECTOMY      COLONOSCOPY  01/2014    HERNIA REPAIR      bialt    MOHS SURGERY  08/06/2020    Right forehead\    [place] ARTHROCENTESIS ASPIR&/INJ MAJOR JT/BURSA W/O US Bilateral 10/19/2018    BILATERAL HIP INJECTION WITH FLUOROSCOPY performed by Windy Paredes MD at [address] Left     cyst and labrum tear repair    TOTAL HIP ARTHROPLASTY  11/27/2019    Dr Rio Blood VASECTOMY                                                     Current Outpatient Medications   Medication Sig Dispense Refill    sertraline (ZOLOFT) 25 MG tablet TAKE ONE TABLET BY MOUTH DAILY 30 tablet 0    amitriptyline (ELAVIL) 10 MG tablet TAKE ONE TABLET BY MOUTH ONCE NIGHTLY 30 tablet 2    clopidogrel (PLAVIX) 75 MG tablet Take 75 mg by mouth daily      hydroxychloroquine (PLAQUENIL) 200 MG tablet Take 200 mg by mouth 2 times daily      metoprolol succinate (TOPROL XL) 25 MG extended release tablet Take 25 mg by mouth nightly      triamcinolone (KENALOG) 0.1 % cream Apply to affected areas of the skin on the neck twice daily for up to 2 weeks or until improved.  45 g 2    aspirin 81 MG EC tablet Take 81 mg by mouth daily  losartan (COZAAR) 25 MG tablet Take 25 mg by mouth daily      atorvastatin (LIPITOR) 80 MG tablet Take 80 mg by mouth daily      Cholecalciferol (VITAMIN D) 2000 UNITS CAPS capsule Take by mouth PATIENT ONLY TAKES DURING WINTER MONTHS       No current facility-administered medications for this visit. No Known Allergies    Social History     Tobacco Use    Smoking status: Never Smoker    Smokeless tobacco: Never Used   Substance Use Topics    Alcohol use: Yes     Comment: weekly        Family History   Problem Relation Age of Onset    High Blood Pressure Mother     Cancer Mother         colon    Cancer Father         lung, kidney    High Blood Pressure Sister     Heart Disease Brother     Early Death Brother     High Blood Pressure Brother     High Blood Pressure Brother         Review Of Systems    Skin: no abnormal pigmentation, rash, scaling, itching, masses, hair or nail changes  Eyes: negative  Ears/Nose/Throat: negative  Respiratory: negative  Cardiovascular: negative  Gastrointestinal: negative  Genitourinary: negative  Musculoskeletal: bilateral wrist pain and stiffness   Neurologic: negative  Psychiatric: negative  Hematologic/Lymphatic/Immunologic: negative  Endocrine: negative    PHYSICAL EXAMINATION:  /82   Pulse 62   Temp 97.1 °F (36.2 °C)   Ht 6' (1.829 m)   Wt 180 lb 9.6 oz (81.9 kg)   SpO2 94%   BMI 24.49 kg/m²   General appearance - healthy, alert, no distress  Skin - Skin color, texture, turgor normal. No rashes or lesions. Head - Normocephalic. No masses, lesions, tenderness or abnormalities  Eyes - conjunctivae/corneas clear. PERRL, EOM's intact. Ears - External ears normal. Canals clear. TM's normal.  Nose/Sinuses - Nares normal. Septum midline. Mucosa normal. No drainage or sinus tenderness. Neck - Neck supple. No adenopathy. Thyroid symmetric, normal size,  Back - Back symmetric, no curvature. ROM normal. No CVA tenderness.   Lungs - Percussion normal. Good diaphragmatic excursion. Lungs clear  Heart - Regular rate and rhythm, with no rub, murmur or gallop noted. Abdomen - Abdomen soft, non-tender. BS normal. No masses, organomegaly  Extremities - Extremities normal. No deformities, edema, or skin discoloration  Musculoskeletal - left wrist pain and stiffness   Neuro - Gait normal. Reflexes normal and symmetric. Sensation grossly normal.  No focal weakness      ASSESSMENT and PLAN:    1. Pre-op exam  I was asked to see Louise Smith for preoperative evaluation by Dr. Og Velasquez. Medication adjustment: Hold aspirin and Plavix for 7 days prior to surgery. N.p.o. after midnight before surgery. Cardioprotection:  Continue metoprolol. 2. Arthritis of left wrist  Unimproved arthritis of the left wrist.    3. Coronary artery disease involving native coronary artery of native heart without angina pectoris  Stable coronary artery disease. Hold aspirin and Plavix for 7 days prior to procedure. He is medically cleared for surgery and anesthesia.     Copy to Dr. Bah Sites

## 2022-05-05 DIAGNOSIS — F32.0 MILD SINGLE CURRENT EPISODE OF MAJOR DEPRESSIVE DISORDER (HCC): ICD-10-CM

## 2022-05-06 RX ORDER — SERTRALINE HYDROCHLORIDE 25 MG/1
TABLET, FILM COATED ORAL
Qty: 30 TABLET | Refills: 0 | Status: SHIPPED | OUTPATIENT
Start: 2022-05-06 | End: 2022-05-09

## 2022-05-09 DIAGNOSIS — F32.0 MILD SINGLE CURRENT EPISODE OF MAJOR DEPRESSIVE DISORDER (HCC): ICD-10-CM

## 2022-05-09 RX ORDER — SERTRALINE HYDROCHLORIDE 25 MG/1
TABLET, FILM COATED ORAL
Qty: 30 TABLET | Refills: 5 | Status: SHIPPED | OUTPATIENT
Start: 2022-05-09

## 2022-11-07 ENCOUNTER — TELEPHONE (OUTPATIENT)
Dept: INTERNAL MEDICINE CLINIC | Age: 62
End: 2022-11-07

## 2022-11-07 NOTE — TELEPHONE ENCOUNTER
----- Message from Antonio Lisa sent at 11/7/2022 12:30 PM EST -----  Subject: Appointment Request    Reason for Call: Established Patient Appointment needed: Routine Pre-Op    QUESTIONS    Reason for appointment request? Available appointments did not meet   patient need     Additional Information for Provider? The patient is having wrist surgery   on 12/16/2022 at [facility] outpatient center by Dr. Ricci Naylor. The patient is not sure if an EKG is needed.    ---------------------------------------------------------------------------  --------------  [facility]  [phone]; OK to leave message on voicemail  ---------------------------------------------------------------------------  --------------  SCRIPT ANSWERS  COVID Screen: Love Felder

## 2022-12-06 ENCOUNTER — OFFICE VISIT (OUTPATIENT)
Dept: INTERNAL MEDICINE CLINIC | Age: 62
End: 2022-12-06
Payer: COMMERCIAL

## 2022-12-06 VITALS
TEMPERATURE: 97.2 F | BODY MASS INDEX: 24.52 KG/M2 | HEIGHT: 72 IN | OXYGEN SATURATION: 99 % | DIASTOLIC BLOOD PRESSURE: 82 MMHG | HEART RATE: 76 BPM | WEIGHT: 181 LBS | SYSTOLIC BLOOD PRESSURE: 122 MMHG

## 2022-12-06 DIAGNOSIS — Z01.818 PRE-OP EXAM: Primary | ICD-10-CM

## 2022-12-06 DIAGNOSIS — I25.10 CORONARY ARTERY DISEASE INVOLVING NATIVE CORONARY ARTERY OF NATIVE HEART WITHOUT ANGINA PECTORIS: ICD-10-CM

## 2022-12-06 DIAGNOSIS — M19.031 ARTHRITIS OF RIGHT WRIST: ICD-10-CM

## 2022-12-06 PROCEDURE — 99203 OFFICE O/P NEW LOW 30 MIN: CPT | Performed by: INTERNAL MEDICINE

## 2022-12-06 ASSESSMENT — PATIENT HEALTH QUESTIONNAIRE - PHQ9
SUM OF ALL RESPONSES TO PHQ9 QUESTIONS 1 & 2: 0
2. FEELING DOWN, DEPRESSED OR HOPELESS: 0
SUM OF ALL RESPONSES TO PHQ QUESTIONS 1-9: 0
4. FEELING TIRED OR HAVING LITTLE ENERGY: 0
SUM OF ALL RESPONSES TO PHQ QUESTIONS 1-9: 0
1. LITTLE INTEREST OR PLEASURE IN DOING THINGS: 0
1. LITTLE INTEREST OR PLEASURE IN DOING THINGS: 0
3. TROUBLE FALLING OR STAYING ASLEEP: 0
8. MOVING OR SPEAKING SO SLOWLY THAT OTHER PEOPLE COULD HAVE NOTICED. OR THE OPPOSITE, BEING SO FIGETY OR RESTLESS THAT YOU HAVE BEEN MOVING AROUND A LOT MORE THAN USUAL: 0
9. THOUGHTS THAT YOU WOULD BE BETTER OFF DEAD, OR OF HURTING YOURSELF: 0
2. FEELING DOWN, DEPRESSED OR HOPELESS: 0
SUM OF ALL RESPONSES TO PHQ9 QUESTIONS 1 & 2: 0
SUM OF ALL RESPONSES TO PHQ QUESTIONS 1-9: 0
10. IF YOU CHECKED OFF ANY PROBLEMS, HOW DIFFICULT HAVE THESE PROBLEMS MADE IT FOR YOU TO DO YOUR WORK, TAKE CARE OF THINGS AT HOME, OR GET ALONG WITH OTHER PEOPLE: 0
SUM OF ALL RESPONSES TO PHQ QUESTIONS 1-9: 0
7. TROUBLE CONCENTRATING ON THINGS, SUCH AS READING THE NEWSPAPER OR WATCHING TELEVISION: 0
5. POOR APPETITE OR OVEREATING: 0
SUM OF ALL RESPONSES TO PHQ QUESTIONS 1-9: 0
SUM OF ALL RESPONSES TO PHQ QUESTIONS 1-9: 0
6. FEELING BAD ABOUT YOURSELF - OR THAT YOU ARE A FAILURE OR HAVE LET YOURSELF OR YOUR FAMILY DOWN: 0

## 2022-12-06 NOTE — PATIENT INSTRUCTIONS
You can take the sertraline the morning of surgery with a sip of water when you first get up. You can take your night pills the evening before surgery. Do not take your clopidogiril or aspirin for 1 week before surgery.

## 2022-12-06 NOTE — PROGRESS NOTES
Chief Complaint   Patient presents with    Pre-op Exam     Right Wrist fusion Dr. Cherri Eubanks @ [facility] 12/16/22     [phone]     Joaquín Amato is a 57 y.o. male who presents for a preoperative physical examination. He is scheduled to have right wrist arthrodesis done by Dr. Bear Clark at [facility] on 12/16/22. History of Present Illness:      Clyde Quintanilla has been having persistent pain in his right wrist for the last 1-2 years. Past Medical History:   Diagnosis Date    Allergic rhinitis     Anxiety     Chicken pox     Degenerative joint disease (DJD) of lumbar spine 12/17/2014    Depression     External hemorrhoid         Review of patient's past surgical history indicates:     Past Surgical History:   Procedure Laterality Date    APPENDECTOMY      COLONOSCOPY  01/2014    HERNIA REPAIR      bialt    MOHS SURGERY  08/06/2020    Right forehead\    [place] ARTHROCENTESIS ASPIR&/INJ MAJOR JT/BURSA W/O US Bilateral 10/19/2018    BILATERAL HIP INJECTION WITH FLUOROSCOPY performed by Myrna Sorto MD at [address] Left     cyst and labrum tear repair    TOTAL HIP ARTHROPLASTY Left 11/27/2019    Dr Eber Perdomo Right 08/07/2019    Dr. Lolis Pacheco                                                     Current Outpatient Medications   Medication Sig Dispense Refill    sertraline (ZOLOFT) 25 MG tablet TAKE ONE TABLET BY MOUTH DAILY 30 tablet 5    clopidogrel (PLAVIX) 75 MG tablet Take 75 mg by mouth daily      hydroxychloroquine (PLAQUENIL) 200 MG tablet Take 200 mg by mouth 2 times daily      metoprolol succinate (TOPROL XL) 25 MG extended release tablet Take 25 mg by mouth nightly      triamcinolone (KENALOG) 0.1 % cream Apply to affected areas of the skin on the neck twice daily for up to 2 weeks or until improved.  45 g 2    aspirin 81 MG EC tablet Take 81 mg by mouth daily      losartan (COZAAR) 25 MG tablet Take 25 mg by mouth daily      atorvastatin (LIPITOR) 80 MG tablet Take 80 mg by mouth daily      Cholecalciferol (VITAMIN D) 2000 UNITS CAPS capsule Take by mouth PATIENT ONLY TAKES DURING WINTER MONTHS      amitriptyline (ELAVIL) 10 MG tablet TAKE ONE TABLET BY MOUTH ONCE NIGHTLY 30 tablet 2     No current facility-administered medications for this visit. No Known Allergies    Social History     Tobacco Use    Smoking status: Never    Smokeless tobacco: Never   Substance Use Topics    Alcohol use: Yes     Comment: weekly        Family History   Problem Relation Age of Onset    High Blood Pressure Mother     Cancer Mother         colon    Cancer Father         lung, kidney    High Blood Pressure Sister     Heart Disease Brother     Early Death Brother     High Blood Pressure Brother     High Blood Pressure Brother         Review Of Systems    Skin: no abnormal pigmentation, rash, scaling, itching, masses, hair or nail changes  Eyes: negative  Ears/Nose/Throat: negative  Respiratory: negative  Cardiovascular: negative  Gastrointestinal: negative  Genitourinary: negative  Musculoskeletal: right wrist pain   Neurologic: negative  Psychiatric: negative  Hematologic/Lymphatic/Immunologic: negative  Endocrine: negative    PHYSICAL EXAMINATION:  /82 (Site: Left Upper Arm, Position: Sitting, Cuff Size: Large Adult)   Pulse 76   Temp 97.2 °F (36.2 °C)   Ht 6' (1.829 m)   Wt 181 lb (82.1 kg)   SpO2 99%   BMI 24.55 kg/m²   General appearance - healthy, alert, no distress  Skin - Skin color, texture, turgor normal. No rashes or lesions. Head - Normocephalic. No masses, lesions, tenderness or abnormalities  Eyes - conjunctivae/corneas clear. EOM's intact. Ears - External ears normal.   Neck - Neck supple. No adenopathy. Thyroid symmetric, normal size,  Back - Back symmetric, no curvature. ROM normal. No CVA tenderness. Lungs - Lungs clear  Heart - Regular rate and rhythm, no murmur   Abdomen - Abdomen soft, non-tender.  BS normal. No masses, organomegaly  Extremities - Extremities normal. No deformities, edema, or skin discoloration   Musculoskeletal - right wrist pain, and stiffness   Neuro - Gait normal. Sensation grossly normal.  No focal weakness      ASSESSMENT and PLAN:    1. Pre-op exam  I was asked to see Sharif Gradywhitney for preoperative assessment by Dr Geovanna Parker. Medication adjustment: Sharif Douglass will stop his plavix and aspirin 1 week before surgery. He will take his usual medicine the night before surgery. The morning of surgery, he will only take his metoprolol with a sip of water when he first gets up. He will not take his losartan on the day of surgery. Cardioprotection: Sharif Douglass will continue on his metoprolol, including the morning of surgery. 2. Arthritis of right wrist  Worsening right wrist pain and arthritis. 3. Coronary artery disease involving native coronary artery of native heart without angina pectoris  Stable. Continue on beta blocker, ASA and Plavix as per instructions. He is medically cleared for surgery and anesthesia.     Copy to Dr. Geovanna Parker

## 2023-03-22 DIAGNOSIS — F32.0 MILD SINGLE CURRENT EPISODE OF MAJOR DEPRESSIVE DISORDER (HCC): ICD-10-CM

## 2023-03-23 RX ORDER — SERTRALINE HYDROCHLORIDE 25 MG/1
TABLET, FILM COATED ORAL
Qty: 30 TABLET | Refills: 5 | Status: SHIPPED | OUTPATIENT
Start: 2023-03-23

## 2023-04-27 ENCOUNTER — OFFICE VISIT (OUTPATIENT)
Dept: DERMATOLOGY | Age: 63
End: 2023-04-27
Payer: COMMERCIAL

## 2023-04-27 DIAGNOSIS — L81.4 SOLAR LENTIGO: ICD-10-CM

## 2023-04-27 DIAGNOSIS — L57.0 ACTINIC KERATOSIS: Primary | ICD-10-CM

## 2023-04-27 PROCEDURE — 99212 OFFICE O/P EST SF 10 MIN: CPT | Performed by: DERMATOLOGY

## 2023-04-27 PROCEDURE — 17000 DESTRUCT PREMALG LESION: CPT | Performed by: DERMATOLOGY

## 2023-04-27 PROCEDURE — 1123F ACP DISCUSS/DSCN MKR DOCD: CPT | Performed by: DERMATOLOGY

## 2023-04-27 PROCEDURE — 17003 DESTRUCT PREMALG LES 2-14: CPT | Performed by: DERMATOLOGY

## 2023-04-27 NOTE — PROGRESS NOTES
[facility]  True Patterson MD  [phone]      John Solomon  1960    70 y.o. male     Date of Visit: 4/27/2023    Chief Complaint: skin lesions    History of Present Illness:    1. He presents today for persistent scaly lesion on the right side of the nose. 2.  He has stable freckling on the sides of the face. He is wearing facial sunscreen daily now. He has a history of an infiltrating BCC of the right forehead-treated with Mohs by Dr. Gretchen Iglesias on 8/6/2020. Review of Systems:  Gen: Feels well, good sense of health. Past Medical History, Family History, Surgical History, Medications and Allergies reviewed.     Past Medical History:   Diagnosis Date    Allergic rhinitis     Anxiety     Chicken pox     Degenerative joint disease (DJD) of lumbar spine 12/17/2014    Depression     External hemorrhoid      Past Surgical History:   Procedure Laterality Date    APPENDECTOMY      COLONOSCOPY  01/2014    HERNIA REPAIR      bialt    MOHS SURGERY  08/06/2020    Right forehead\    [place] ARTHROCENTESIS ASPIR&/INJ MAJOR JT/BURSA W/O US Bilateral 10/19/2018    BILATERAL HIP INJECTION WITH FLUOROSCOPY performed by Keven iWlson MD at [address] [place] Left     cyst and labrum tear repair    TOTAL HIP ARTHROPLASTY Left 11/27/2019    Dr Becky Buerger Right 08/07/2019    Dr. Tha Slater         No Known Allergies  Outpatient Medications Marked as Taking for the 4/27/23 encounter (Office Visit) with Lesia Phalen, MD   Medication Sig Dispense Refill    sertraline (ZOLOFT) 25 MG tablet TAKE ONE TABLET BY MOUTH DAILY 30 tablet 5    metoprolol succinate (TOPROL XL) 25 MG extended release tablet Take 1 tablet by mouth nightly      aspirin 81 MG EC tablet Take 1 tablet by mouth daily      losartan (COZAAR) 25 MG tablet Take 1 tablet by mouth daily      atorvastatin (LIPITOR) 80 MG tablet Take 1 tablet by mouth daily      Cholecalciferol (VITAMIN D) 2000

## 2023-09-18 DIAGNOSIS — F32.0 MILD SINGLE CURRENT EPISODE OF MAJOR DEPRESSIVE DISORDER (HCC): ICD-10-CM

## 2023-09-18 RX ORDER — SERTRALINE HYDROCHLORIDE 25 MG/1
TABLET, FILM COATED ORAL
Qty: 30 TABLET | Refills: 5 | OUTPATIENT
Start: 2023-09-18

## 2023-11-20 DIAGNOSIS — F32.0 MILD SINGLE CURRENT EPISODE OF MAJOR DEPRESSIVE DISORDER (HCC): ICD-10-CM

## 2023-11-21 RX ORDER — SERTRALINE HYDROCHLORIDE 25 MG/1
TABLET, FILM COATED ORAL
Qty: 30 TABLET | Refills: 0 | Status: SHIPPED | OUTPATIENT
Start: 2023-11-21

## 2023-11-21 NOTE — TELEPHONE ENCOUNTER
Medication:   Requested Prescriptions     Pending Prescriptions Disp Refills    sertraline (ZOLOFT) 25 MG tablet [Pharmacy Med Name: SERTRALINE HCL 25 MG TABLET] 30 tablet 5     Sig: TAKE ONE TABLET BY MOUTH DAILY        Last Filled:      Patient Phone Number: [phone] (home) [phone] (work)    Last appt: 12/6/2022   Next appt: Visit date not found    Last OARRS:       8/26/2017     7:41 PM   RX Monitoring   Attestation The Prescription Monitoring Report for this patient was reviewed today. Periodic Controlled Substance Monitoring No signs of potential drug abuse or diversion identified.

## 2023-12-27 DIAGNOSIS — F32.0 MILD SINGLE CURRENT EPISODE OF MAJOR DEPRESSIVE DISORDER (HCC): ICD-10-CM

## 2023-12-27 RX ORDER — SERTRALINE HYDROCHLORIDE 25 MG/1
25 TABLET, FILM COATED ORAL DAILY
Qty: 30 TABLET | Refills: 0 | OUTPATIENT
Start: 2023-12-27

## 2024-01-02 ASSESSMENT — PATIENT HEALTH QUESTIONNAIRE - PHQ9
SUM OF ALL RESPONSES TO PHQ QUESTIONS 1-9: 5
10. IF YOU CHECKED OFF ANY PROBLEMS, HOW DIFFICULT HAVE THESE PROBLEMS MADE IT FOR YOU TO DO YOUR WORK, TAKE CARE OF THINGS AT HOME, OR GET ALONG WITH OTHER PEOPLE: 1
SUM OF ALL RESPONSES TO PHQ QUESTIONS 1-9: 5
3. TROUBLE FALLING OR STAYING ASLEEP: NOT AT ALL
9. THOUGHTS THAT YOU WOULD BE BETTER OFF DEAD, OR OF HURTING YOURSELF: NOT AT ALL
4. FEELING TIRED OR HAVING LITTLE ENERGY: 2
7. TROUBLE CONCENTRATING ON THINGS, SUCH AS READING THE NEWSPAPER OR WATCHING TELEVISION: SEVERAL DAYS
5. POOR APPETITE OR OVEREATING: 0
8. MOVING OR SPEAKING SO SLOWLY THAT OTHER PEOPLE COULD HAVE NOTICED. OR THE OPPOSITE, BEING SO FIGETY OR RESTLESS THAT YOU HAVE BEEN MOVING AROUND A LOT MORE THAN USUAL: 0
1. LITTLE INTEREST OR PLEASURE IN DOING THINGS: SEVERAL DAYS
3. TROUBLE FALLING OR STAYING ASLEEP: 0
6. FEELING BAD ABOUT YOURSELF - OR THAT YOU ARE A FAILURE OR HAVE LET YOURSELF OR YOUR FAMILY DOWN: 0
SUM OF ALL RESPONSES TO PHQ QUESTIONS 1-9: 5
SUM OF ALL RESPONSES TO PHQ QUESTIONS 1-9: 5
2. FEELING DOWN, DEPRESSED OR HOPELESS: SEVERAL DAYS
8. MOVING OR SPEAKING SO SLOWLY THAT OTHER PEOPLE COULD HAVE NOTICED. OR THE OPPOSITE - BEING SO FIDGETY OR RESTLESS THAT YOU HAVE BEEN MOVING AROUND A LOT MORE THAN USUAL: NOT AT ALL
SUM OF ALL RESPONSES TO PHQ9 QUESTIONS 1 & 2: 2
5. POOR APPETITE OR OVEREATING: NOT AT ALL
7. TROUBLE CONCENTRATING ON THINGS, SUCH AS READING THE NEWSPAPER OR WATCHING TELEVISION: 1
SUM OF ALL RESPONSES TO PHQ QUESTIONS 1-9: 5
2. FEELING DOWN, DEPRESSED OR HOPELESS: 1
9. THOUGHTS THAT YOU WOULD BE BETTER OFF DEAD, OR OF HURTING YOURSELF: 0
6. FEELING BAD ABOUT YOURSELF - OR THAT YOU ARE A FAILURE OR HAVE LET YOURSELF OR YOUR FAMILY DOWN: NOT AT ALL
4. FEELING TIRED OR HAVING LITTLE ENERGY: MORE THAN HALF THE DAYS
1. LITTLE INTEREST OR PLEASURE IN DOING THINGS: 1
10. IF YOU CHECKED OFF ANY PROBLEMS, HOW DIFFICULT HAVE THESE PROBLEMS MADE IT FOR YOU TO DO YOUR WORK, TAKE CARE OF THINGS AT HOME, OR GET ALONG WITH OTHER PEOPLE: SOMEWHAT DIFFICULT

## 2024-01-05 ENCOUNTER — OFFICE VISIT (OUTPATIENT)
Dept: INTERNAL MEDICINE CLINIC | Age: 64
End: 2024-01-05
Payer: COMMERCIAL

## 2024-01-05 VITALS
HEART RATE: 50 BPM | BODY MASS INDEX: 27.17 KG/M2 | HEIGHT: 70 IN | DIASTOLIC BLOOD PRESSURE: 74 MMHG | TEMPERATURE: 97.5 F | SYSTOLIC BLOOD PRESSURE: 120 MMHG | WEIGHT: 189.8 LBS | OXYGEN SATURATION: 98 %

## 2024-01-05 DIAGNOSIS — Z00.00 WELL ADULT EXAM: Primary | ICD-10-CM

## 2024-01-05 DIAGNOSIS — I25.10 CORONARY ARTERY DISEASE INVOLVING NATIVE CORONARY ARTERY OF NATIVE HEART WITHOUT ANGINA PECTORIS: ICD-10-CM

## 2024-01-05 DIAGNOSIS — M19.90 INFLAMMATORY ARTHRITIS: ICD-10-CM

## 2024-01-05 DIAGNOSIS — N52.9 ERECTILE DYSFUNCTION, UNSPECIFIED ERECTILE DYSFUNCTION TYPE: ICD-10-CM

## 2024-01-05 DIAGNOSIS — F32.0 MILD SINGLE CURRENT EPISODE OF MAJOR DEPRESSIVE DISORDER (HCC): ICD-10-CM

## 2024-01-05 PROCEDURE — 99397 PER PM REEVAL EST PAT 65+ YR: CPT | Performed by: INTERNAL MEDICINE

## 2024-01-05 RX ORDER — TADALAFIL 10 MG/1
10 TABLET ORAL DAILY PRN
Qty: 20 TABLET | Refills: 0 | Status: SHIPPED | OUTPATIENT
Start: 2024-01-05

## 2024-01-05 RX ORDER — SERTRALINE HYDROCHLORIDE 25 MG/1
25 TABLET, FILM COATED ORAL DAILY
Qty: 90 TABLET | Refills: 3 | Status: SHIPPED | OUTPATIENT
Start: 2024-01-05

## 2024-01-05 SDOH — ECONOMIC STABILITY: FOOD INSECURITY: WITHIN THE PAST 12 MONTHS, THE FOOD YOU BOUGHT JUST DIDN'T LAST AND YOU DIDN'T HAVE MONEY TO GET MORE.: NEVER TRUE

## 2024-01-05 SDOH — ECONOMIC STABILITY: HOUSING INSECURITY
IN THE LAST 12 MONTHS, WAS THERE A TIME WHEN YOU DID NOT HAVE A STEADY PLACE TO SLEEP OR SLEPT IN A SHELTER (INCLUDING NOW)?: NO

## 2024-01-05 SDOH — ECONOMIC STABILITY: INCOME INSECURITY: HOW HARD IS IT FOR YOU TO PAY FOR THE VERY BASICS LIKE FOOD, HOUSING, MEDICAL CARE, AND HEATING?: NOT HARD AT ALL

## 2024-01-05 SDOH — ECONOMIC STABILITY: FOOD INSECURITY: WITHIN THE PAST 12 MONTHS, YOU WORRIED THAT YOUR FOOD WOULD RUN OUT BEFORE YOU GOT MONEY TO BUY MORE.: NEVER TRUE

## 2024-01-05 NOTE — PROGRESS NOTES
2024    Khoa Galindo (:  1960) rio 63 y.o. male, here for a preventive medicine evaluation.    Patient Active Problem List   Diagnosis    Allergic rhinitis    Anxiety    External hemorrhoid    Sciatica    Degenerative joint disease of right hip    Fam hx-ischem heart disease    Decreased libido    Degenerative joint disease (DJD) of lumbar spine    Degenerative joint disease involving multiple joints    Vitamin D deficiency    Restless leg syndrome    Mild single current episode of major depressive disorder (HCC)    Basal cell carcinoma of forehead    Coronary artery disease involving native coronary artery of native heart without angina pectoris     Overall doing well - had hand surgeries this year, have been helpful. Taking heart meds, no issues. Still working. Has been taking sertraline, does help with mood. At one point he saw rheumatology for his arthritis, started on hydroxychloroquine and it was helpful but he didn't follow up so is not taking it now. Wonders if he should see rheum again.  Has had some erectile dysfunction, was prescribed sildenafil and taking 60mg. Helps but not totally.    Review of Systems   Constitutional:  Negative for fatigue and unexpected weight change.   HENT:  Negative for ear pain and sinus pain.    Eyes:  Negative for visual disturbance.   Respiratory:  Negative for cough and shortness of breath.    Cardiovascular:  Negative for chest pain and leg swelling.   Gastrointestinal:  Negative for constipation and diarrhea.   Genitourinary:  Negative for difficulty urinating and dysuria.   Musculoskeletal:  Positive for arthralgias. Negative for joint swelling.   Skin:  Negative for rash.   Neurological:  Negative for weakness and numbness.   Psychiatric/Behavioral:  Negative for dysphoric mood. The patient is not nervous/anxious.         Prior to Visit Medications    Medication Sig Taking? Authorizing Provider   sertraline (ZOLOFT) 25 MG tablet Take 1 tablet by mouth

## 2024-01-07 PROBLEM — B02.9 HERPES ZOSTER WITHOUT COMPLICATION: Status: RESOLVED | Noted: 2017-02-01 | Resolved: 2024-01-07

## 2024-01-07 ASSESSMENT — ENCOUNTER SYMPTOMS
SINUS PAIN: 0
COUGH: 0
CONSTIPATION: 0
DIARRHEA: 0
SHORTNESS OF BREATH: 0

## 2024-05-30 RX ORDER — TADALAFIL 20 MG/1
20 TABLET ORAL DAILY PRN
Qty: 30 TABLET | Refills: 2 | Status: SHIPPED | OUTPATIENT
Start: 2024-05-30

## 2024-06-04 SDOH — HEALTH STABILITY: PHYSICAL HEALTH: ON AVERAGE, HOW MANY MINUTES DO YOU ENGAGE IN EXERCISE AT THIS LEVEL?: 30 MIN

## 2024-06-04 SDOH — HEALTH STABILITY: PHYSICAL HEALTH: ON AVERAGE, HOW MANY DAYS PER WEEK DO YOU ENGAGE IN MODERATE TO STRENUOUS EXERCISE (LIKE A BRISK WALK)?: 2 DAYS

## 2024-06-05 ENCOUNTER — OFFICE VISIT (OUTPATIENT)
Dept: INTERNAL MEDICINE CLINIC | Age: 64
End: 2024-06-05
Payer: COMMERCIAL

## 2024-06-05 VITALS
BODY MASS INDEX: 27.35 KG/M2 | TEMPERATURE: 97.9 F | HEIGHT: 70 IN | OXYGEN SATURATION: 98 % | SYSTOLIC BLOOD PRESSURE: 122 MMHG | DIASTOLIC BLOOD PRESSURE: 78 MMHG | HEART RATE: 53 BPM | WEIGHT: 191 LBS

## 2024-06-05 DIAGNOSIS — M15.9 PRIMARY OSTEOARTHRITIS INVOLVING MULTIPLE JOINTS: Primary | ICD-10-CM

## 2024-06-05 DIAGNOSIS — M77.40 METATARSALGIA, UNSPECIFIED LATERALITY: ICD-10-CM

## 2024-06-05 DIAGNOSIS — I10 ESSENTIAL HYPERTENSION: ICD-10-CM

## 2024-06-05 DIAGNOSIS — E78.5 HYPERLIPIDEMIA, UNSPECIFIED HYPERLIPIDEMIA TYPE: ICD-10-CM

## 2024-06-05 DIAGNOSIS — C44.319 BASAL CELL CARCINOMA OF FOREHEAD: ICD-10-CM

## 2024-06-05 PROCEDURE — 3078F DIAST BP <80 MM HG: CPT | Performed by: STUDENT IN AN ORGANIZED HEALTH CARE EDUCATION/TRAINING PROGRAM

## 2024-06-05 PROCEDURE — 3074F SYST BP LT 130 MM HG: CPT | Performed by: STUDENT IN AN ORGANIZED HEALTH CARE EDUCATION/TRAINING PROGRAM

## 2024-06-05 PROCEDURE — 99214 OFFICE O/P EST MOD 30 MIN: CPT | Performed by: STUDENT IN AN ORGANIZED HEALTH CARE EDUCATION/TRAINING PROGRAM

## 2024-06-05 PROCEDURE — 1123F ACP DISCUSS/DSCN MKR DOCD: CPT | Performed by: STUDENT IN AN ORGANIZED HEALTH CARE EDUCATION/TRAINING PROGRAM

## 2024-06-05 NOTE — PROGRESS NOTES
checks with dermatology    Degenerative joint disease involving multiple joints   History of hip and hand surgery due to arthritis.  Previously saw Dr. Yaakov Rios with Ortho at [facility].  The patient was told he is not able to take NSAIDs due to coronary artery disease.  Pain in his knees is becoming debilitating.  - Referred to orthopedics    Metatarsalgia   Patient has upcoming appointment with podiatry to address this problem.  He already tried conservative management with orthotic insoles.  No pain relief.  - Follow with podiatry to discuss this issue further    Essential hypertension   This problem is well-controlled on current regimen.  - Continue losartan  - Continue Toprol-XL       Orders Placed This Encounter    External Referral To Orthopedic Surgery     Referral Priority:   Routine     Referral Type:   Eval and Treat     Referral Reason:   Specialty Services Required     Referred to Provider:   Yaakov Rios MD     Requested Specialty:   Orthopedic Surgery     Number of Visits Requested:   1        No follow-ups on file.

## 2024-06-05 NOTE — ASSESSMENT & PLAN NOTE
History of hip and hand surgery due to arthritis.  Previously saw Dr. Yaakov Rios with Ortho at [facility].  The patient was told he is not able to take NSAIDs due to coronary artery disease.  Pain in his knees is becoming debilitating.  - Referred to orthopedics

## 2024-06-05 NOTE — ASSESSMENT & PLAN NOTE
Patient has upcoming appointment with podiatry to address this problem.  He already tried conservative management with orthotic insoles.  No pain relief.  - Follow with podiatry to discuss this issue further

## 2024-06-05 NOTE — ASSESSMENT & PLAN NOTE
This problem is stable.  Status post excision.  Follows with Dr. Stone.  - Continue routine skin checks with dermatology

## 2024-06-25 ENCOUNTER — OFFICE VISIT (OUTPATIENT)
Dept: DERMATOLOGY | Age: 64
End: 2024-06-25
Payer: COMMERCIAL

## 2024-06-25 DIAGNOSIS — L57.0 ACTINIC KERATOSIS: Primary | ICD-10-CM

## 2024-06-25 PROCEDURE — 17000 DESTRUCT PREMALG LESION: CPT | Performed by: DERMATOLOGY

## 2024-06-25 PROCEDURE — 17003 DESTRUCT PREMALG LES 2-14: CPT | Performed by: DERMATOLOGY

## 2024-06-25 NOTE — PROGRESS NOTES
[facility] Dermatology  Filemon Stone MD  [phone]      Khoa Galindo  1960    64 y.o. male     Date of Visit: 6/25/2024    Chief Complaint: skin lesions    History of Present Illness:    1.  He presents today for several persistent scaly lesions on the face.     Derm Hx:    He has a history of an infiltrating BCC of the right forehead-treated with Mohs by Dr. Jack on 8/6/2020.        Review of Systems:  Gen: Feels well, good sense of health.    Past Medical History, Family History, Surgical History, Medications and Allergies reviewed.    Past Medical History:   Diagnosis Date    Allergic rhinitis     Anxiety     Chicken pox     Degenerative joint disease (DJD) of lumbar spine 12/17/2014    Depression     External hemorrhoid     Herpes zoster without complication 02/01/2017     Past Surgical History:   Procedure Laterality Date    APPENDECTOMY      COLONOSCOPY  01/2014    HERNIA REPAIR      bialt    MOHS SURGERY  08/06/2020    Right forehead\    [place] ARTHROCENTESIS ASPIR&/INJ MAJOR JT/BURSA W/O US Bilateral 10/19/2018    BILATERAL HIP INJECTION WITH FLUOROSCOPY performed by Sheila Ortez MD at [facility]    SHOULDER SURGERY Left     cyst and labrum tear repair    TOTAL HIP ARTHROPLASTY Left 11/27/2019    Dr Rios    TOTAL HIP ARTHROPLASTY Right 08/07/2019    Dr. Rios    VASECTOMY      WRIST SURGERY      for arthritis x3       No Known Allergies  Outpatient Medications Marked as Taking for the 6/25/24 encounter (Office Visit) with Filemon Stone MD   Medication Sig Dispense Refill    tadalafil (CIALIS) 20 MG tablet Take 1 tablet by mouth daily as needed for Erectile Dysfunction 30 tablet 2    sertraline (ZOLOFT) 25 MG tablet Take 1 tablet by mouth daily 90 tablet 3    metoprolol succinate (TOPROL XL) 25 MG extended release tablet Take 1 tablet by mouth nightly      aspirin 81 MG EC tablet Take 1 tablet by mouth daily      losartan (COZAAR) 25 MG tablet Take 1 tablet by mouth daily

## 2024-09-04 NOTE — PROGRESS NOTES
PATIENT NAME: Leno Lane     YOB: 1960     TODAY'S DATE: 11/20/2017    Reason for Visit:  Anal pain    Requesting Physician:  Dr. Laina Bedoya:              The patient is a 59 y.o. male who presents with a long history of episodic anal pain with defecation. The patient notes a tearing type pain with hard bowel movements. Notes minimal blood on the toilet paper. No incontinence. Chief Complaint   Patient presents with    Surgical Consult     Hemorrhoids    Establish Care       REVIEW OF SYSTEMS:  CONSTITUTIONAL:  negative  HEENT:  negative  RESPIRATORY:  negative  CARDIOVASCULAR:  negative  GASTROINTESTINAL:  negative  GENITOURINARY:  negative  HEMATOLOGIC/LYMPHATIC:  negative  NEUROLOGICAL:  negative    PHYSICAL EXAM:  VITALS:  /79   Temp 98 °F (36.7 °C)   Ht 6' (1.829 m)   Wt 178 lb (80.7 kg)   BMI 24.14 kg/m²     CONSTITUTIONAL:  alert, no apparent distress and normal weight  EYES:  sclera clear  ENT:  normocepalic, without obvious abnormality  NECK:  supple, symmetrical, trachea midline and no carotid bruits  LUNGS:  clear to auscultation  CARDIOVASCULAR:  regular rate and rhythm and no murmur noted  ABDOMEN:  no scars, normal bowel sounds, soft, non-distended, non-tender, voluntary guarding absent, no masses palpated and hernia absent  MUSCULOSKELETAL:  0+ pitting edema lower extremities  NEUROLOGIC:  Mental Status Exam:  Level of Alertness:   awake  Orientation:   person, place, time  SKIN:  On rectal exam, posterior anal skin tag, posterior fissure noted, no masses noted, normal tone    IMPRESSION/RECOMMENDATIONS:    The patient has a posterior anal fissure. No surgical intervention is needed at this time. I have recommended Anusol HC, high fiber diet, good and perianal hygiene. Given his chronic fissure, I have recommended that he follow in 1 month with one of my colorectal partners.       Ketan Hebert Never

## 2024-10-23 RX ORDER — TADALAFIL 20 MG/1
20 TABLET ORAL DAILY PRN
Qty: 90 TABLET | Refills: 0 | Status: SHIPPED | OUTPATIENT
Start: 2024-10-23

## 2024-10-23 NOTE — TELEPHONE ENCOUNTER
Last appointment: 6/5/2024  Next appointment: Visit date not found  Last refill:   Requested Prescriptions     Pending Prescriptions Disp Refills    tadalafil (CIALIS) 20 MG tablet 30 tablet 2     Sig: Take 1 tablet by mouth daily as needed for Erectile Dysfunction

## 2024-10-30 NOTE — PROGRESS NOTES
Patient C/O of feeling funny, ready to pass out. montior shows sinus Sagar Peals I lower 40's. Blood pressure 74/50. laid back, O2 on at  3l Fluids to lV wide open. 810 am  BP 94/58, HR 50. States he is feeling better. show

## 2025-01-15 DIAGNOSIS — F32.0 MILD SINGLE CURRENT EPISODE OF MAJOR DEPRESSIVE DISORDER (HCC): ICD-10-CM

## 2025-01-15 RX ORDER — SERTRALINE HYDROCHLORIDE 25 MG/1
25 TABLET, FILM COATED ORAL DAILY
Qty: 90 TABLET | Refills: 1 | Status: SHIPPED | OUTPATIENT
Start: 2025-01-15

## 2025-01-15 NOTE — TELEPHONE ENCOUNTER
Refill Req from [facility] [id number] - [place], [place] - [address] - P [phone] - F [phone]           sertraline (ZOLOFT) 25 MG tablet [[id number]]

## 2025-02-03 ENCOUNTER — PATIENT MESSAGE (OUTPATIENT)
Dept: INTERNAL MEDICINE CLINIC | Age: 65
End: 2025-02-03

## 2025-02-03 RX ORDER — TADALAFIL 20 MG/1
20 TABLET ORAL DAILY PRN
Qty: 90 TABLET | Refills: 0 | Status: SHIPPED | OUTPATIENT
Start: 2025-02-03

## 2025-04-02 ENCOUNTER — OFFICE VISIT (OUTPATIENT)
Dept: INTERNAL MEDICINE CLINIC | Age: 65
End: 2025-04-02
Payer: COMMERCIAL

## 2025-04-02 VITALS
OXYGEN SATURATION: 93 % | BODY MASS INDEX: 28.76 KG/M2 | HEART RATE: 64 BPM | TEMPERATURE: 98.1 F | WEIGHT: 194.2 LBS | SYSTOLIC BLOOD PRESSURE: 122 MMHG | HEIGHT: 69 IN | DIASTOLIC BLOOD PRESSURE: 72 MMHG

## 2025-04-02 DIAGNOSIS — R06.02 SHORTNESS OF BREATH: Primary | ICD-10-CM

## 2025-04-02 PROCEDURE — 3078F DIAST BP <80 MM HG: CPT | Performed by: STUDENT IN AN ORGANIZED HEALTH CARE EDUCATION/TRAINING PROGRAM

## 2025-04-02 PROCEDURE — 1123F ACP DISCUSS/DSCN MKR DOCD: CPT | Performed by: STUDENT IN AN ORGANIZED HEALTH CARE EDUCATION/TRAINING PROGRAM

## 2025-04-02 PROCEDURE — 3074F SYST BP LT 130 MM HG: CPT | Performed by: STUDENT IN AN ORGANIZED HEALTH CARE EDUCATION/TRAINING PROGRAM

## 2025-04-02 PROCEDURE — 99214 OFFICE O/P EST MOD 30 MIN: CPT | Performed by: STUDENT IN AN ORGANIZED HEALTH CARE EDUCATION/TRAINING PROGRAM

## 2025-04-02 PROCEDURE — 93000 ELECTROCARDIOGRAM COMPLETE: CPT | Performed by: STUDENT IN AN ORGANIZED HEALTH CARE EDUCATION/TRAINING PROGRAM

## 2025-04-02 PROCEDURE — G2211 COMPLEX E/M VISIT ADD ON: HCPCS | Performed by: STUDENT IN AN ORGANIZED HEALTH CARE EDUCATION/TRAINING PROGRAM

## 2025-04-02 SDOH — ECONOMIC STABILITY: FOOD INSECURITY: WITHIN THE PAST 12 MONTHS, YOU WORRIED THAT YOUR FOOD WOULD RUN OUT BEFORE YOU GOT MONEY TO BUY MORE.: NEVER TRUE

## 2025-04-02 SDOH — ECONOMIC STABILITY: FOOD INSECURITY: WITHIN THE PAST 12 MONTHS, THE FOOD YOU BOUGHT JUST DIDN'T LAST AND YOU DIDN'T HAVE MONEY TO GET MORE.: NEVER TRUE

## 2025-04-02 ASSESSMENT — PATIENT HEALTH QUESTIONNAIRE - PHQ9
SUM OF ALL RESPONSES TO PHQ QUESTIONS 1-9: 2
1. LITTLE INTEREST OR PLEASURE IN DOING THINGS: NOT AT ALL
2. FEELING DOWN, DEPRESSED OR HOPELESS: NOT AT ALL
4. FEELING TIRED OR HAVING LITTLE ENERGY: MORE THAN HALF THE DAYS
6. FEELING BAD ABOUT YOURSELF - OR THAT YOU ARE A FAILURE OR HAVE LET YOURSELF OR YOUR FAMILY DOWN: NOT AT ALL
5. POOR APPETITE OR OVEREATING: NOT AT ALL
9. THOUGHTS THAT YOU WOULD BE BETTER OFF DEAD, OR OF HURTING YOURSELF: NOT AT ALL
7. TROUBLE CONCENTRATING ON THINGS, SUCH AS READING THE NEWSPAPER OR WATCHING TELEVISION: NOT AT ALL
10. IF YOU CHECKED OFF ANY PROBLEMS, HOW DIFFICULT HAVE THESE PROBLEMS MADE IT FOR YOU TO DO YOUR WORK, TAKE CARE OF THINGS AT HOME, OR GET ALONG WITH OTHER PEOPLE: NOT DIFFICULT AT ALL
3. TROUBLE FALLING OR STAYING ASLEEP: NOT AT ALL
8. MOVING OR SPEAKING SO SLOWLY THAT OTHER PEOPLE COULD HAVE NOTICED. OR THE OPPOSITE, BEING SO FIGETY OR RESTLESS THAT YOU HAVE BEEN MOVING AROUND A LOT MORE THAN USUAL: NOT AT ALL

## 2025-04-02 NOTE — PROGRESS NOTES
2025    Khoa Galindo (:  1960) is a 65 y.o. male, here for evaluation of the following medical concerns:    Chief Complaint   Patient presents with    Shortness of Breath     Recently can't catch his breath, can't expand his lungs.  Got worse this past month           HPI    Patient is here with concerns of exertional shortness of breath x 1.5 years. Over the last month it has severely worsened. No CP, N/V, diaphoresis.    He feels his lungs cannot expand enough when he goes up a flight of stairs or does something exertional. He has to sit and catch his breath for a number of minutes after ~30 seconds of exertion. This is a change. He has been working as a  for over 40 years. No smoking hx. Has a history of chronic cough.  Has some seasonal allergies but no PND.   No clinical signs of bleeding.  No hematochezia or melena.      Prior to Visit Medications    Medication Sig Taking? Authorizing Provider   tadalafil (CIALIS) 20 MG tablet Take 1 tablet by mouth daily as needed for Erectile Dysfunction Yes Bridgett Corrigan MD   sertraline (ZOLOFT) 25 MG tablet Take 1 tablet by mouth daily Yes Bridgett Corrigan MD   metoprolol succinate (TOPROL XL) 25 MG extended release tablet Take 1 tablet by mouth nightly Yes Magi George MD   aspirin 81 MG EC tablet Take 1 tablet by mouth daily Yes Magi George MD   losartan (COZAAR) 25 MG tablet Take 1 tablet by mouth daily Yes Magi George MD   atorvastatin (LIPITOR) 80 MG tablet Take 1 tablet by mouth daily Yes Magi George MD   Cholecalciferol (VITAMIN D) 2000 UNITS CAPS capsule Take by mouth PATIENT ONLY TAKES DURING WINTER MONTHS Yes Magi George MD        No Known Allergies    Past Medical History:   Diagnosis Date    Allergic rhinitis     Anxiety     Chicken pox     Degenerative joint disease (DJD) of lumbar spine 2014    Depression     External hemorrhoid     Herpes zoster without complication

## 2025-04-02 NOTE — ASSESSMENT & PLAN NOTE
Chronic problem with significant worsening over the last month.  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

## 2025-04-16 ENCOUNTER — HOSPITAL ENCOUNTER (OUTPATIENT)
Dept: PULMONOLOGY | Age: 65
Discharge: HOME | End: 2025-04-16
Attending: STUDENT IN AN ORGANIZED HEALTH CARE EDUCATION/TRAINING PROGRAM
Payer: COMMERCIAL

## 2025-04-16 DIAGNOSIS — R06.02 SHORTNESS OF BREATH: ICD-10-CM

## 2025-04-16 PROCEDURE — 94760 N-INVAS EAR/PLS OXIMETRY 1: CPT

## 2025-04-16 PROCEDURE — 94060 EVALUATION OF WHEEZING: CPT

## 2025-04-16 PROCEDURE — 94726 PLETHYSMOGRAPHY LUNG VOLUMES: CPT

## 2025-04-16 PROCEDURE — 94664 DEMO&/EVAL PT USE INHALER: CPT

## 2025-04-16 PROCEDURE — 94729 DIFFUSING CAPACITY: CPT

## 2025-04-16 PROCEDURE — 6360000002 HC RX W HCPCS: Performed by: STUDENT IN AN ORGANIZED HEALTH CARE EDUCATION/TRAINING PROGRAM

## 2025-04-16 RX ORDER — ALBUTEROL SULFATE 0.83 MG/ML
2.5 SOLUTION RESPIRATORY (INHALATION) ONCE
Status: COMPLETED | OUTPATIENT
Start: 2025-04-16 | End: 2025-04-16

## 2025-04-16 RX ADMIN — ALBUTEROL SULFATE 2.5 MG: 2.5 SOLUTION RESPIRATORY (INHALATION) at 13:44

## 2025-04-18 NOTE — PROCEDURES
PROCEDURE NOTE  Date: 4/18/2025   Name: Khoa Galindo  YOB: 1960    Procedures    Pulmonary Function Testing      Patient name:  Khoa Galindo      Unit #:   [id number]   Date of test:  4/16/2025   Date of interpretation:   4/18/2025    Mr. Khoa Galindo is a 65 y.o. male. The spirometry data were acceptable and reproducible.     Spirometry:  The FEV-1 was 3.36 liters (93% of predicted).  The FVC was 4.45 liters (93% of predicted).  Response to inhaled bronchodilators (albuterol) was normal. The FEV-1/FVC ratio was normal.   Flow volume loops were normal.     Lung volumes:  Lung volumes by plethysmography, show a total lung capacity of 100% of predicted and a residual volume of 113% of predicted.       Diffusion capacity was found to be 91% of predicted, without correction.          Interpretation:  Normal pulmonary function test

## 2025-06-03 RX ORDER — TADALAFIL 20 MG/1
20 TABLET ORAL DAILY PRN
Qty: 90 TABLET | Refills: 1 | Status: SHIPPED | OUTPATIENT
Start: 2025-06-03

## 2025-06-03 NOTE — TELEPHONE ENCOUNTER
Last appointment: 4/2/2025  Next appointment: Visit date not found        Last refill: (2/3/2025) by Bridgett Corrigan MD

## 2025-07-20 DIAGNOSIS — F32.0 MILD SINGLE CURRENT EPISODE OF MAJOR DEPRESSIVE DISORDER: ICD-10-CM

## 2025-07-21 RX ORDER — SERTRALINE HYDROCHLORIDE 25 MG/1
25 TABLET, FILM COATED ORAL DAILY
Qty: 90 TABLET | Refills: 0 | Status: SHIPPED | OUTPATIENT
Start: 2025-07-21

## 2025-07-21 NOTE — TELEPHONE ENCOUNTER
Last appointment: 4/2/2025  Next appointment: Visit date not found        Last refill: (1/15/2025) by Bridgett Corrigan MD      Patient's Dad called to schedule patient with Dr Vasquez. Patient is not currently an [facility] patient. Dad states his sister spoke with Dr Vasquez and Dr said he would see patient. As there isn't written approval for this exception on file, writer is entering this message to check with Dr. Please advise if patient can be seen by Dr Vasquez even though she is not an [facility] patient

## 2025-09-04 ENCOUNTER — OFFICE VISIT (OUTPATIENT)
Age: 65
End: 2025-09-04
Payer: COMMERCIAL

## 2025-09-04 DIAGNOSIS — L57.0 ACTINIC KERATOSIS: Primary | ICD-10-CM

## 2025-09-04 PROCEDURE — 1123F ACP DISCUSS/DSCN MKR DOCD: CPT | Performed by: DERMATOLOGY

## 2025-09-04 PROCEDURE — 99213 OFFICE O/P EST LOW 20 MIN: CPT | Performed by: DERMATOLOGY

## 2025-09-04 RX ORDER — FLUOROURACIL 50 MG/G
CREAM TOPICAL
Qty: 40 G | Refills: 0 | Status: SHIPPED | OUTPATIENT
Start: 2025-09-04

## (undated) DEVICE — STANDARD HYPODERMIC NEEDLE,POLYPROPYLENE HUB: Brand: MONOJECT

## (undated) DEVICE — PORT VLV 2 W NDL FREE SMRTSITE

## (undated) DEVICE — CHLORAPREP 26ML ORANGE

## (undated) DEVICE — DISPOSABLE OR TOWEL: Brand: CARDINAL HEALTH

## (undated) DEVICE — Device: Brand: JELCO

## (undated) DEVICE — STERILE POLYISOPRENE POWDER-FREE SURGICAL GLOVES: Brand: PROTEXIS

## (undated) DEVICE — SYRINGE MED 3ML CLR PLAS STD N CTRL LUERLOCK TIP DISP

## (undated) DEVICE — UNIVERSAL BLOCK TRAY: Brand: AVANOS*

## (undated) DEVICE — PEN: MARKING STD 100/CS: Brand: MEDICAL ACTION INDUSTRIES

## (undated) DEVICE — NEEDLE SPNL 22GA L3.5IN BLK HUB S STL REG WALL FIT STYL W/

## (undated) DEVICE — GAUZE,SPONGE,4"X4",8PLY,STRL,LF,10/TRAY: Brand: MEDLINE

## (undated) DEVICE — MEDIA CONTRAST RX ISOVUE-300 61% 30ML VIALS